# Patient Record
Sex: FEMALE | Race: WHITE
[De-identification: names, ages, dates, MRNs, and addresses within clinical notes are randomized per-mention and may not be internally consistent; named-entity substitution may affect disease eponyms.]

---

## 2023-01-17 ENCOUNTER — HOSPITAL ENCOUNTER (OUTPATIENT)
Dept: HOSPITAL 6 - RAD | Age: 73
End: 2023-01-17
Attending: FAMILY MEDICINE
Payer: MEDICARE

## 2023-01-17 DIAGNOSIS — J98.11: ICD-10-CM

## 2023-01-17 DIAGNOSIS — R91.8: Primary | ICD-10-CM

## 2023-02-10 ENCOUNTER — HOSPITAL ENCOUNTER (OUTPATIENT)
Dept: HOSPITAL 6 - RAD | Age: 73
End: 2023-02-10
Attending: FAMILY MEDICINE
Payer: MEDICARE

## 2023-02-10 DIAGNOSIS — Z90.5: ICD-10-CM

## 2023-02-10 DIAGNOSIS — N28.1: Primary | ICD-10-CM

## 2023-02-10 DIAGNOSIS — N13.30: ICD-10-CM

## 2023-10-30 ENCOUNTER — HOSPITAL ENCOUNTER (OUTPATIENT)
Dept: HOSPITAL 19 - SDCO | Age: 73
Discharge: HOME | End: 2023-10-30
Attending: SURGERY
Payer: MEDICARE

## 2023-10-30 VITALS — DIASTOLIC BLOOD PRESSURE: 65 MMHG | HEART RATE: 84 BPM | TEMPERATURE: 98 F | SYSTOLIC BLOOD PRESSURE: 170 MMHG

## 2023-10-30 VITALS — WEIGHT: 204.37 LBS | BODY MASS INDEX: 36.21 KG/M2 | HEIGHT: 63 IN

## 2023-10-30 VITALS — DIASTOLIC BLOOD PRESSURE: 70 MMHG | TEMPERATURE: 97.4 F | HEART RATE: 75 BPM | SYSTOLIC BLOOD PRESSURE: 169 MMHG

## 2023-10-30 VITALS — SYSTOLIC BLOOD PRESSURE: 158 MMHG | HEART RATE: 60 BPM | TEMPERATURE: 97.1 F | DIASTOLIC BLOOD PRESSURE: 36 MMHG

## 2023-10-30 VITALS — HEART RATE: 78 BPM | SYSTOLIC BLOOD PRESSURE: 146 MMHG | DIASTOLIC BLOOD PRESSURE: 35 MMHG

## 2023-10-30 VITALS — SYSTOLIC BLOOD PRESSURE: 159 MMHG | DIASTOLIC BLOOD PRESSURE: 71 MMHG | HEART RATE: 74 BPM

## 2023-10-30 DIAGNOSIS — E66.9: ICD-10-CM

## 2023-10-30 DIAGNOSIS — Z87.891: ICD-10-CM

## 2023-10-30 DIAGNOSIS — K80.10: Primary | ICD-10-CM

## 2023-10-30 LAB
ANION GAP SERPL CALC-SCNC: 10 MMOL/L (ref 7–16)
BUN SERPL-MCNC: 56 MG/DL (ref 10–20)
CALCIUM SERPL-MCNC: 9.6 MG/DL (ref 8.4–10.2)
CHLORIDE SERPL-SCNC: 104 MMOL/L (ref 98–107)
CO2 SERPL-SCNC: 25 MMOL/L (ref 23–31)
CREAT SERPL-SCNC: 1.28 MG/DL (ref 0.57–1.11)
GLUCOSE SERPL-MCNC: 156 MG/DL (ref 70–99)
POTASSIUM SERPL-SCNC: 4.6 MMOL/L (ref 3.5–4.5)
SODIUM SERPL-SCNC: 139 MMOL/L (ref 136–145)

## 2023-10-30 NOTE — NUR
1043: PATIENT AMBULATED TO BAY 7 WITH STEADY GAIT. ALERT AND ORIENTED X4.
PATIENT STATED UNDERSTANDING OF PROCEDURE. CONSENTS SIGNED. ASSESSMENT
COMPLETED. 20G IV STARTED IN RIGHT FOREARM. LR INFUSING WITHOUT DIFFICULTIES.
WARM BLANKET PROVIDED. NO FURTHER NEEDS NOTED AT THIS TIME. RESTING IN COT.
CALL LIGHT IN REACH. YOBANI GALLOWAY, AT BEDSIDE.
 
1145: PATIENT STATED CONTRAST DYE HAS CAUSED HIVES IN THE PAST. DR. CORONADO
NOTIFIED AND STATED OKAY TO GIVE ICG PRE-OP AS ORDERED.
 
1215: PATIENT HAS NO S/S OF HIVES OR FURTHER COMPLAINTS AT THIS TIME. OR NURSE
APPLIED 2X2 TO PATIENT RIGHT EYE DUE TO PREVIOUS EYE SURGERY. RESTING IN COT.
CALL LIGHT IN REACH. YOBANI GALLOWAY, AT BEDSIDE.

## 2024-03-19 ENCOUNTER — HOSPITAL ENCOUNTER (EMERGENCY)
Dept: HOSPITAL 6 - ED | Age: 74
Discharge: ANOTHER HOSPITAL | End: 2024-03-19
Payer: MEDICARE

## 2024-03-19 VITALS — BODY MASS INDEX: 37.11 KG/M2 | HEIGHT: 62.99 IN | WEIGHT: 209.44 LBS

## 2024-03-19 VITALS — DIASTOLIC BLOOD PRESSURE: 100 MMHG | SYSTOLIC BLOOD PRESSURE: 176 MMHG

## 2024-03-19 DIAGNOSIS — Z90.49: ICD-10-CM

## 2024-03-19 DIAGNOSIS — R79.89: ICD-10-CM

## 2024-03-19 DIAGNOSIS — R10.10: ICD-10-CM

## 2024-03-19 DIAGNOSIS — R07.89: Primary | ICD-10-CM

## 2024-03-19 DIAGNOSIS — R20.0: ICD-10-CM

## 2024-03-19 LAB
ALBUMIN SERPL-MCNC: 3.8 G/DL (ref 3.4–4.8)
ALT SERPL-CCNC: 9 U/L (ref 0–55)
APPEARANCE UR: CLEAR
AST SERPL-CCNC: 16 U/L (ref 5–34)
BASOPHILS # BLD: 0.02 K/MM3 (ref 0.02–0.1)
BILIRUB SERPL-MCNC: 0.3 MG/DL (ref 0.2–1.2)
BILIRUB UR QL STRIP.AUTO: NEGATIVE
CALCIUM SERPL-MCNC: 12.1 MG/DL (ref 8.3–10.5)
CO2 SERPL-SCNC: 32 MMOL/L (ref 23–31)
COLOR UR AUTO: (no result)
EOSINOPHIL # BLD: 0.09 K/MM3 (ref 0.04–0.4)
EOSINOPHIL NFR BLD: 1.2 % (ref 1–5)
ERYTHROCYTE [DISTWIDTH] IN BLOOD BY AUTOMATED COUNT: 11.2 % (ref 11.5–14.5)
GLUCOSE SERPL-MCNC: 198 MG/DL (ref 65–105)
GLUCOSE UR QL STRIP.AUTO: (no result)
HCT VFR BLD AUTO: 41.7 % (ref 37–47)
HGB BLD-MCNC: 13.4 G/DL (ref 12.5–16)
KETONES UR QL STRIP.AUTO: NEGATIVE
LEUKOCYTE ESTERASE UR QL STRIP: NEGATIVE
LYMPHOCYTES # BLD: 1.94 K/MM3 (ref 1.5–4)
MCH RBC QN AUTO: 32 PG (ref 27–31)
MCHC RBC AUTO-ENTMCNC: 32 G/DL (ref 33–37)
MCV RBC AUTO: 98 FL (ref 78–100)
MONOCYTES # BLD: 0.66 K/MM3 (ref 0.2–0.8)
NEUTROPHILS # BLD: 4.89 K/MM3 (ref 1.4–6.5)
NITRITE UR QL STRIP: NEGATIVE
PH UR STRIP.AUTO: 7.5 [PH] (ref 5–8)
PLATELET # BLD AUTO: 223 K/MM3 (ref 130–400)
PMV BLD AUTO: 11 FL (ref 7.4–10.4)
PROT ?TM UR-MCNC: (no result) MG/DL
PROT SERPL-MCNC: 7.4 G/DL (ref 6.2–8.1)
RBC # BLD AUTO: 4.25 M/MM3 (ref 4.1–5.3)
RBC UR QL AUTO: (no result)
SODIUM SERPL-SCNC: 140 MMOL/L (ref 136–145)
SP GR UR STRIP.AUTO: 1.02 (ref 1–1.03)
TROPONIN I SERPL-MCNC: 0.05 NG/ML (ref 0–0.03)
WBC # BLD AUTO: 7.6 K/MM3 (ref 4.8–10.8)
WBC #/AREA URNS HPF: (no result) /HPF (ref 0–3)

## 2024-04-23 ENCOUNTER — HOSPITAL ENCOUNTER (OUTPATIENT)
Dept: HOSPITAL 6 - CARDREHAB | Age: 74
LOS: 7 days | Discharge: HOME | End: 2024-04-30
Attending: NURSE PRACTITIONER
Payer: MEDICARE

## 2024-04-23 DIAGNOSIS — Z48.812: Primary | ICD-10-CM

## 2024-04-23 DIAGNOSIS — I21.4: ICD-10-CM

## 2024-04-23 DIAGNOSIS — Z98.61: ICD-10-CM

## 2024-05-22 ENCOUNTER — HOSPITAL ENCOUNTER (INPATIENT)
Dept: HOSPITAL 19 - COL.ER | Age: 74
LOS: 2 days | Discharge: HOME | DRG: 811 | End: 2024-05-24
Attending: INTERNAL MEDICINE | Admitting: INTERNAL MEDICINE
Payer: MEDICARE

## 2024-05-22 DIAGNOSIS — D64.9: Primary | ICD-10-CM

## 2024-05-22 DIAGNOSIS — Z90.5: ICD-10-CM

## 2024-05-22 DIAGNOSIS — I08.3: ICD-10-CM

## 2024-05-22 DIAGNOSIS — Z87.19: ICD-10-CM

## 2024-05-22 DIAGNOSIS — Z99.81: ICD-10-CM

## 2024-05-22 DIAGNOSIS — Z79.02: ICD-10-CM

## 2024-05-22 DIAGNOSIS — N18.9: ICD-10-CM

## 2024-05-22 DIAGNOSIS — Z90.49: ICD-10-CM

## 2024-05-22 DIAGNOSIS — Z87.891: ICD-10-CM

## 2024-05-22 DIAGNOSIS — Z79.899: ICD-10-CM

## 2024-05-22 DIAGNOSIS — J44.9: ICD-10-CM

## 2024-05-22 DIAGNOSIS — I50.32: ICD-10-CM

## 2024-05-22 DIAGNOSIS — I25.10: ICD-10-CM

## 2024-05-22 DIAGNOSIS — I25.2: ICD-10-CM

## 2024-05-22 DIAGNOSIS — I13.0: ICD-10-CM

## 2024-05-22 DIAGNOSIS — Z88.8: ICD-10-CM

## 2024-05-22 DIAGNOSIS — Z79.82: ICD-10-CM

## 2024-05-22 DIAGNOSIS — Z95.5: ICD-10-CM

## 2024-05-22 DIAGNOSIS — E78.5: ICD-10-CM

## 2024-05-22 DIAGNOSIS — E11.22: ICD-10-CM

## 2024-05-22 DIAGNOSIS — J96.21: ICD-10-CM

## 2024-08-30 ENCOUNTER — HOSPITAL ENCOUNTER (INPATIENT)
Dept: HOSPITAL 19 - COL.ER | Age: 74
LOS: 3 days | Discharge: HOME | DRG: 193 | End: 2024-09-02
Attending: INTERNAL MEDICINE | Admitting: INTERNAL MEDICINE
Payer: MEDICARE

## 2024-08-30 VITALS — TEMPERATURE: 98.5 F | DIASTOLIC BLOOD PRESSURE: 78 MMHG | HEART RATE: 77 BPM | SYSTOLIC BLOOD PRESSURE: 153 MMHG

## 2024-08-30 VITALS — TEMPERATURE: 98.2 F | DIASTOLIC BLOOD PRESSURE: 68 MMHG | HEART RATE: 83 BPM | SYSTOLIC BLOOD PRESSURE: 151 MMHG

## 2024-08-30 VITALS — HEIGHT: 62.99 IN | BODY MASS INDEX: 38.12 KG/M2 | WEIGHT: 215.17 LBS

## 2024-08-30 VITALS — TEMPERATURE: 98.3 F | SYSTOLIC BLOOD PRESSURE: 148 MMHG | HEART RATE: 75 BPM | DIASTOLIC BLOOD PRESSURE: 72 MMHG

## 2024-08-30 VITALS — TEMPERATURE: 97.9 F | DIASTOLIC BLOOD PRESSURE: 69 MMHG | HEART RATE: 74 BPM | SYSTOLIC BLOOD PRESSURE: 151 MMHG

## 2024-08-30 VITALS — SYSTOLIC BLOOD PRESSURE: 149 MMHG | TEMPERATURE: 98 F | HEART RATE: 87 BPM | DIASTOLIC BLOOD PRESSURE: 61 MMHG

## 2024-08-30 VITALS — SYSTOLIC BLOOD PRESSURE: 135 MMHG

## 2024-08-30 DIAGNOSIS — J96.21: ICD-10-CM

## 2024-08-30 DIAGNOSIS — Z90.5: ICD-10-CM

## 2024-08-30 DIAGNOSIS — E11.22: ICD-10-CM

## 2024-08-30 DIAGNOSIS — Z88.6: ICD-10-CM

## 2024-08-30 DIAGNOSIS — J44.9: ICD-10-CM

## 2024-08-30 DIAGNOSIS — K59.03: ICD-10-CM

## 2024-08-30 DIAGNOSIS — Z79.899: ICD-10-CM

## 2024-08-30 DIAGNOSIS — N18.30: ICD-10-CM

## 2024-08-30 DIAGNOSIS — Z91.018: ICD-10-CM

## 2024-08-30 DIAGNOSIS — R53.81: ICD-10-CM

## 2024-08-30 DIAGNOSIS — N17.9: ICD-10-CM

## 2024-08-30 DIAGNOSIS — Z23: ICD-10-CM

## 2024-08-30 DIAGNOSIS — I27.20: ICD-10-CM

## 2024-08-30 DIAGNOSIS — Z88.8: ICD-10-CM

## 2024-08-30 DIAGNOSIS — I50.32: ICD-10-CM

## 2024-08-30 DIAGNOSIS — Z79.82: ICD-10-CM

## 2024-08-30 DIAGNOSIS — Z87.891: ICD-10-CM

## 2024-08-30 DIAGNOSIS — D50.9: ICD-10-CM

## 2024-08-30 DIAGNOSIS — E78.5: ICD-10-CM

## 2024-08-30 DIAGNOSIS — Z20.822: ICD-10-CM

## 2024-08-30 DIAGNOSIS — I13.0: ICD-10-CM

## 2024-08-30 DIAGNOSIS — T45.4X5A: ICD-10-CM

## 2024-08-30 DIAGNOSIS — Z90.49: ICD-10-CM

## 2024-08-30 DIAGNOSIS — Z99.81: ICD-10-CM

## 2024-08-30 DIAGNOSIS — J18.1: Primary | ICD-10-CM

## 2024-08-30 DIAGNOSIS — Z91.041: ICD-10-CM

## 2024-08-30 LAB
ALBUMIN SERPL-MCNC: 2.6 G/DL (ref 3.4–4.8)
ALP SERPL-CCNC: 103 U/L (ref 40–150)
ALT SERPL-CCNC: < 6 U/L (ref 0–55)
ANION GAP SERPL CALC-SCNC: 14 MMOL/L (ref 7–16)
AST SERPL-CCNC: 11 U/L (ref 5–34)
BASOPHILS # BLD: 0 K/MM3 (ref 0–0.2)
BASOPHILS NFR BLD AUTO: 0.3 % (ref 0–2)
BILIRUB SERPL-MCNC: 0.3 MG/DL (ref 0.2–1.2)
BUN SERPL-MCNC: 87 MG/DL (ref 10–20)
CALCIUM SERPL-MCNC: 9.5 MG/DL (ref 8.4–10.2)
CHLORIDE SERPL-SCNC: 93 MEQ/L (ref 98–107)
CREAT SERPL-SCNC: 2.26 MG/DL (ref 0.57–1.11)
EOSINOPHIL # BLD: 0.2 K/MM3 (ref 0–0.7)
EOSINOPHIL NFR BLD: 1.5 % (ref 0–4)
ERYTHROCYTE [DISTWIDTH] IN BLOOD BY AUTOMATED COUNT: 12.7 % (ref 11.5–14.5)
GLUCOSE SERPL-MCNC: 282 MG/DL (ref 70–99)
GRANULOCYTES # BLD AUTO: 80.5 % (ref 42.2–75.2)
HCT VFR BLD AUTO: 21.4 % (ref 37–47)
HGB BLD-MCNC: 6.5 G/DL (ref 12.5–16)
INR BLD: 1.2 (ref 0.8–3)
IRON SERPL-MCNC: 24 UG/DL (ref 50–175)
LYMPHOCYTES # BLD: 0.8 K/MM3 (ref 1.2–3.4)
LYMPHOCYTES NFR BLD: 7.2 % (ref 20–51)
MCH RBC QN AUTO: 30 PG (ref 27–31)
MCHC RBC AUTO-ENTMCNC: 30 G/DL (ref 33–37)
MCV RBC AUTO: 98 FL (ref 80–100)
MONOCYTES # BLD: 1.1 K/MM3 (ref 0.1–0.6)
MONOCYTES NFR BLD AUTO: 10 % (ref 1.7–9.3)
NEUTROPHILS # BLD: 8.9 K/MM3 (ref 1.4–6.5)
PLATELET # BLD AUTO: 416 K/MM3 (ref 130–400)
PMV BLD AUTO: 10.6 FL (ref 7.4–10.4)
POTASSIUM SERPL-SCNC: 3.6 MEQ/L (ref 3.5–4.5)
PROT SERPL-MCNC: 6.9 G/DL (ref 6.2–8.1)
PROTHROMBIN TIME: 13.2 SECONDS (ref 9.7–12.8)
RBC # BLD AUTO: 2.19 M/MM3 (ref 4.1–5.3)
RETICS #: 0.07 M/MM3 (ref 0.02–0.16)
RETICS/RBC NFR AUTO: 3.3 % (ref 0.5–3.52)
SODIUM SERPL-SCNC: 139 MEQ/L (ref 136–145)
TROPONIN I SERPL-MCNC: 0.01 NG/ML (ref 0–0.03)

## 2024-08-30 PROCEDURE — A9284 NON-ELECTRONIC SPIROMETER: HCPCS

## 2024-08-30 PROCEDURE — 30233N1 TRANSFUSION OF NONAUTOLOGOUS RED BLOOD CELLS INTO PERIPHERAL VEIN, PERCUTANEOUS APPROACH: ICD-10-PCS | Performed by: INTERNAL MEDICINE

## 2024-08-30 PROCEDURE — P9016 RBC LEUKOCYTES REDUCED: HCPCS

## 2024-08-30 NOTE — NUR
Pt arrived to medical floor from ED. Report received from ANAM Aquino by phone.
Admission intake and assessment completed. +2 BLE edema noted with blisters to
both BLE around ankles. Small scab with redness noted on inner Lt lower calf.
O2 in place at 4L NC. IVF infusing into Rt AC with no complications. Oriented
pt to room, call light, and bathroom. Pt wearing personal glasses and partial
dentures during hospital stay. Son Munir at bedside. Pt denies pain at this
time rating 0/10. Home medications, allergies, and pharmacy reviewed with pt.
Pt has no request at this time. Call light within reach.

## 2024-08-31 VITALS — TEMPERATURE: 98.1 F | DIASTOLIC BLOOD PRESSURE: 70 MMHG | SYSTOLIC BLOOD PRESSURE: 142 MMHG | HEART RATE: 88 BPM

## 2024-08-31 VITALS — TEMPERATURE: 98.3 F | HEART RATE: 86 BPM | DIASTOLIC BLOOD PRESSURE: 72 MMHG | SYSTOLIC BLOOD PRESSURE: 145 MMHG

## 2024-08-31 VITALS — DIASTOLIC BLOOD PRESSURE: 51 MMHG | HEART RATE: 81 BPM | TEMPERATURE: 97.9 F | SYSTOLIC BLOOD PRESSURE: 105 MMHG

## 2024-08-31 VITALS — HEART RATE: 87 BPM | SYSTOLIC BLOOD PRESSURE: 157 MMHG | DIASTOLIC BLOOD PRESSURE: 63 MMHG | TEMPERATURE: 97.7 F

## 2024-08-31 VITALS — DIASTOLIC BLOOD PRESSURE: 78 MMHG | HEART RATE: 72 BPM | TEMPERATURE: 98 F | SYSTOLIC BLOOD PRESSURE: 152 MMHG

## 2024-08-31 VITALS — DIASTOLIC BLOOD PRESSURE: 73 MMHG | HEART RATE: 67 BPM | SYSTOLIC BLOOD PRESSURE: 145 MMHG | TEMPERATURE: 97.7 F

## 2024-08-31 VITALS — DIASTOLIC BLOOD PRESSURE: 66 MMHG | SYSTOLIC BLOOD PRESSURE: 148 MMHG | TEMPERATURE: 97.8 F | HEART RATE: 83 BPM

## 2024-08-31 VITALS — DIASTOLIC BLOOD PRESSURE: 69 MMHG | SYSTOLIC BLOOD PRESSURE: 135 MMHG | HEART RATE: 81 BPM | TEMPERATURE: 98.5 F

## 2024-08-31 VITALS — SYSTOLIC BLOOD PRESSURE: 135 MMHG

## 2024-08-31 VITALS — SYSTOLIC BLOOD PRESSURE: 105 MMHG

## 2024-08-31 VITALS — SYSTOLIC BLOOD PRESSURE: 146 MMHG | TEMPERATURE: 98.4 F | DIASTOLIC BLOOD PRESSURE: 77 MMHG | HEART RATE: 78 BPM

## 2024-08-31 VITALS — SYSTOLIC BLOOD PRESSURE: 152 MMHG

## 2024-08-31 VITALS — SYSTOLIC BLOOD PRESSURE: 145 MMHG

## 2024-08-31 VITALS — SYSTOLIC BLOOD PRESSURE: 157 MMHG

## 2024-08-31 VITALS — SYSTOLIC BLOOD PRESSURE: 142 MMHG

## 2024-08-31 LAB
ANION GAP SERPL CALC-SCNC: 12 MMOL/L (ref 7–16)
BUN SERPL-MCNC: 72 MG/DL (ref 10–20)
CALCIUM SERPL-MCNC: 9.7 MG/DL (ref 8.4–10.2)
CHLORIDE SERPL-SCNC: 97 MEQ/L (ref 98–107)
CREAT SERPL-SCNC: 1.84 MG/DL (ref 0.57–1.11)
CRP SERPL-MCNC: 6.59 MG/DL (ref 0–0.5)
ERYTHROCYTE [DISTWIDTH] IN BLOOD BY AUTOMATED COUNT: 14.1 % (ref 11.5–14.5)
GLUCOSE SERPL-MCNC: 327 MG/DL (ref 70–99)
HCT VFR BLD AUTO: 22.9 % (ref 37–47)
HCT VFR BLD AUTO: 24.7 % (ref 37–47)
HGB BLD-MCNC: 7.6 G/DL (ref 12.5–16)
HGB BLD-MCNC: 7.9 G/DL (ref 12.5–16)
HYPOCHROMIA BLD QL SMEAR: (no result)
LYMPHOCYTES NFR BLD MANUAL: 2 % (ref 20–51)
MAGNESIUM SERPL-MCNC: 1.9 MG/DL (ref 1.6–2.6)
MCH RBC QN AUTO: 30 PG (ref 27–31)
MCHC RBC AUTO-ENTMCNC: 32 G/DL (ref 33–37)
MCV RBC AUTO: 94 FL (ref 80–100)
NEUTS SEG NFR BLD MANUAL: 98 % (ref 42–75.2)
PHOSPHATE SERPL-MCNC: 2.7 MG/DL (ref 2.3–4.7)
PLATELET # BLD AUTO: 437 K/MM3 (ref 130–400)
PLATELET BLD QL SMEAR: (no result)
PMV BLD AUTO: 10.4 FL (ref 7.4–10.4)
POTASSIUM SERPL-SCNC: 4.2 MEQ/L (ref 3.5–4.5)
RBC # BLD AUTO: 2.63 M/MM3 (ref 4.1–5.3)
SODIUM SERPL-SCNC: 140 MEQ/L (ref 136–145)

## 2024-08-31 NOTE — NUR
THE PATIENT RESTED WELL MOST OF THE NIGHT DESPITE ALL OF THE INTERRUPTIONS
THROUGHOUT THE NIGHT. THE PATIENTS VITAL SIGNS REMAINED STABLE AND SHE
RECEIVED HER ONE UNIT OF PRBC'S WITHOUT DIFFICULTY. THE PATIENT REMAINED ON 2
LPM WHICH IS HER BASELINE OXYGEN AMOUNT. THE PATIENT DENIED PAIN OR
DISCOMFORT.

## 2024-08-31 NOTE — NUR
SEDRICK CHAVEZ NOTIFIED OF H&H RESULT OF 7.6/22.9. A RECHECK WILL BE
DONE IN THE LATER AM. AT THIS TIME NO NEW ORDERS.

## 2024-08-31 NOTE — NUR
CALLED AND MADE AWARE OF PATIENT REQUEST FOR TUMS, AS WELL AS
OINTMENT FOR BUTTOCKS WOUND. WILL MONITOR SUGAR AGAIN IN AN HOUR, WANTING TO
GIVEN INSULIN TIME TO WORK

## 2024-08-31 NOTE — NUR
medication list reviewed with patient, corrections made. Patient concerned
about missing medication. will review with doctor.

## 2024-08-31 NOTE — NUR
SW met with patient and family to complete initial assessment for discharge
planning.  Patient known to this SW from previous hospitalization.  Patient
continues to live in [facility] alone, has her nephew Enrique living across
street to provide assistance if needed.  Patient's son Munir ([phone])
also assists as needed.  Patient sees Dr. Nicholas Fisher as her PCP and
uses [facility].  Patient has oxygen at home provided by [employer].  Patient denied having a DPOA completed but asked to complete one.  She
named son Munir as DPOA with nephew Enrique ([phone]) as alternate.  Plan is
to return home at discharge.
 
Discharge plan:  Home

## 2024-08-31 NOTE — NUR
Patient resting in bed. She had been up to the bathroom this am and voided
without problems. Breakfast ordered, denies nausea. Patient elevated blood
sugar treated, we discussed side effects of predisone, elevating blood
glucose. Scds ble. Patient reports only pain is in her coccyx wound/cyst. Will
monitor

## 2024-08-31 NOTE — NUR
Follow-up visit: Patient's visitors have left. Patient is watching some TV and
relaxing; states she has no spiritual needs at this time. Accpeted prayer.
Patient thanked  for visit; stated she enjoys having someone to talk
to and  would be welcome to visit again.

## 2024-08-31 NOTE — NUR
THE PATIENT RECEIVED HER BLOOD TRANSFUSION OF ONE UNIT WHICH STARTED AT 22:35
AND ENDED AT 01:04. THE PATIENT TOLERATED THE TRANSFUSION WITHOUT AND S/S OF A
TRANSFUSION REACTION. VITAL SIGNS STABLE. WILL MONITOR.

## 2024-08-31 NOTE — NUR
MADE AWARE 435, NOW DOSE OF HUMALOG ORDERED. TELE HEALTH CONSULT
COMPLETED.  ROUNDED, PLAN OF CARE REVIEWED

## 2024-08-31 NOTE — NUR
Patient up to bathroom, unable to have BM. Requesting medication to help her
go.  called and miralax given as ordered.

## 2024-08-31 NOTE — NUR
Patient resting in bed. Blood sugars remain elevated.  called and
another dose of insulin given per orders. Patient frustated with her blood
sugar levels. No interest in dinner. Will report off to nightnurse

## 2024-08-31 NOTE — NUR
Data: Spiritual care visit attempted. Patient was indisposed.  saw
that Patient had at least three visitors.
 
Assessment: None at this time.
 
Plan of Care: Chaplains will remain available as needed/requested while
Patient is admitted to this hospital.

## 2024-09-01 VITALS — SYSTOLIC BLOOD PRESSURE: 131 MMHG

## 2024-09-01 VITALS — TEMPERATURE: 98 F | DIASTOLIC BLOOD PRESSURE: 68 MMHG | SYSTOLIC BLOOD PRESSURE: 103 MMHG | HEART RATE: 80 BPM

## 2024-09-01 VITALS — HEART RATE: 74 BPM | DIASTOLIC BLOOD PRESSURE: 88 MMHG | TEMPERATURE: 97.5 F | SYSTOLIC BLOOD PRESSURE: 148 MMHG

## 2024-09-01 VITALS — DIASTOLIC BLOOD PRESSURE: 68 MMHG | TEMPERATURE: 98.2 F | SYSTOLIC BLOOD PRESSURE: 143 MMHG | HEART RATE: 79 BPM

## 2024-09-01 VITALS — HEART RATE: 90 BPM | DIASTOLIC BLOOD PRESSURE: 64 MMHG | TEMPERATURE: 98.7 F | SYSTOLIC BLOOD PRESSURE: 134 MMHG

## 2024-09-01 VITALS — TEMPERATURE: 97.7 F | SYSTOLIC BLOOD PRESSURE: 155 MMHG | HEART RATE: 77 BPM | DIASTOLIC BLOOD PRESSURE: 65 MMHG

## 2024-09-01 VITALS — HEART RATE: 72 BPM | DIASTOLIC BLOOD PRESSURE: 82 MMHG | SYSTOLIC BLOOD PRESSURE: 131 MMHG | TEMPERATURE: 98 F

## 2024-09-01 VITALS — SYSTOLIC BLOOD PRESSURE: 155 MMHG

## 2024-09-01 VITALS — SYSTOLIC BLOOD PRESSURE: 105 MMHG

## 2024-09-01 VITALS — SYSTOLIC BLOOD PRESSURE: 143 MMHG

## 2024-09-01 VITALS — SYSTOLIC BLOOD PRESSURE: 103 MMHG

## 2024-09-01 LAB
ANION GAP SERPL CALC-SCNC: 12 MMOL/L (ref 7–16)
BASOPHILS # BLD: 0 K/MM3 (ref 0–0.2)
BASOPHILS NFR BLD AUTO: 0.1 % (ref 0–2)
BUN SERPL-MCNC: 68 MG/DL (ref 10–20)
CALCIUM SERPL-MCNC: 9.5 MG/DL (ref 8.4–10.2)
CHLORIDE SERPL-SCNC: 95 MEQ/L (ref 98–107)
CREAT SERPL-SCNC: 1.88 MG/DL (ref 0.57–1.11)
EOSINOPHIL # BLD: 0 K/MM3 (ref 0–0.7)
EOSINOPHIL NFR BLD: 0.2 % (ref 0–4)
ERYTHROCYTE [DISTWIDTH] IN BLOOD BY AUTOMATED COUNT: 13.8 % (ref 11.5–14.5)
GLUCOSE SERPL-MCNC: 238 MG/DL (ref 70–99)
GRANULOCYTES # BLD AUTO: 83.6 % (ref 42.2–75.2)
HCT VFR BLD AUTO: 22.2 % (ref 37–47)
HGB BLD-MCNC: 7.2 G/DL (ref 12.5–16)
LYMPHOCYTES # BLD: 1.3 K/MM3 (ref 1.2–3.4)
LYMPHOCYTES NFR BLD: 9.1 % (ref 20–51)
MAGNESIUM SERPL-MCNC: 1.9 MG/DL (ref 1.6–2.6)
MCH RBC QN AUTO: 30 PG (ref 27–31)
MCHC RBC AUTO-ENTMCNC: 32 G/DL (ref 33–37)
MCV RBC AUTO: 92 FL (ref 80–100)
MONOCYTES # BLD: 0.9 K/MM3 (ref 0.1–0.6)
MONOCYTES NFR BLD AUTO: 6.5 % (ref 1.7–9.3)
NEUTROPHILS # BLD: 12 K/MM3 (ref 1.4–6.5)
PLATELET # BLD AUTO: 390 K/MM3 (ref 130–400)
PMV BLD AUTO: 10.2 FL (ref 7.4–10.4)
POTASSIUM SERPL-SCNC: 3.6 MEQ/L (ref 3.5–4.5)
RBC # BLD AUTO: 2.41 M/MM3 (ref 4.1–5.3)
SODIUM SERPL-SCNC: 139 MEQ/L (ref 136–145)

## 2024-09-01 NOTE — NUR
Patient is sitting up at the edge of the bed, alert and orientd x 4, denies
any pain, discomfort or weaknes. Assessment completed, meds given. Getting 2L
O2 NC. No further needs at this time. Call light within reach.

## 2024-09-01 NOTE — NUR
THE PATIENT DID NOT SLEEP MOST OF THE NIGHT. AROUND 4 AM THE PATIENT WAS NOTED
TO BE RESTING IN BED
WITH HER EYES CLOSED. THE PATIENT WAS UP INDEPENDENTLY IN THE
ROOM WITH A STEADY GAIT. THE PATIENT REQUESTED TYLENOL THIS MORNING FOR
HEADACHE PAIN 4/10. NO OTHER NEEDS AT THIS TIME.

## 2024-09-02 VITALS — SYSTOLIC BLOOD PRESSURE: 138 MMHG

## 2024-09-02 VITALS — DIASTOLIC BLOOD PRESSURE: 69 MMHG | TEMPERATURE: 98.3 F | HEART RATE: 67 BPM | SYSTOLIC BLOOD PRESSURE: 123 MMHG

## 2024-09-02 VITALS — SYSTOLIC BLOOD PRESSURE: 123 MMHG

## 2024-09-02 VITALS — HEART RATE: 76 BPM | TEMPERATURE: 97.9 F | DIASTOLIC BLOOD PRESSURE: 67 MMHG | SYSTOLIC BLOOD PRESSURE: 128 MMHG

## 2024-09-02 VITALS — HEART RATE: 74 BPM | SYSTOLIC BLOOD PRESSURE: 132 MMHG | DIASTOLIC BLOOD PRESSURE: 64 MMHG

## 2024-09-02 VITALS — DIASTOLIC BLOOD PRESSURE: 71 MMHG | SYSTOLIC BLOOD PRESSURE: 118 MMHG | TEMPERATURE: 98.2 F | HEART RATE: 75 BPM

## 2024-09-02 VITALS — SYSTOLIC BLOOD PRESSURE: 146 MMHG | HEART RATE: 87 BPM | DIASTOLIC BLOOD PRESSURE: 69 MMHG | TEMPERATURE: 98.4 F

## 2024-09-02 VITALS — SYSTOLIC BLOOD PRESSURE: 146 MMHG

## 2024-09-02 VITALS — HEART RATE: 79 BPM | SYSTOLIC BLOOD PRESSURE: 119 MMHG | DIASTOLIC BLOOD PRESSURE: 71 MMHG | TEMPERATURE: 98.2 F

## 2024-09-02 VITALS — HEART RATE: 78 BPM | DIASTOLIC BLOOD PRESSURE: 66 MMHG | TEMPERATURE: 98.2 F | SYSTOLIC BLOOD PRESSURE: 117 MMHG

## 2024-09-02 VITALS — TEMPERATURE: 98.1 F | HEART RATE: 71 BPM | DIASTOLIC BLOOD PRESSURE: 69 MMHG | SYSTOLIC BLOOD PRESSURE: 138 MMHG

## 2024-09-02 VITALS — SYSTOLIC BLOOD PRESSURE: 128 MMHG

## 2024-09-02 VITALS — SYSTOLIC BLOOD PRESSURE: 134 MMHG

## 2024-09-02 LAB
ANION GAP SERPL CALC-SCNC: 11 MMOL/L (ref 7–16)
BUN SERPL-MCNC: 56 MG/DL (ref 10–20)
CALCIUM SERPL-MCNC: 9.2 MG/DL (ref 8.4–10.2)
CHLORIDE SERPL-SCNC: 97 MEQ/L (ref 98–107)
CREAT SERPL-SCNC: 1.7 MG/DL (ref 0.57–1.11)
GLUCOSE SERPL-MCNC: 126 MG/DL (ref 70–99)
HCT VFR BLD AUTO: 20.4 % (ref 37–47)
HCT VFR BLD AUTO: 25.1 % (ref 37–47)
HGB BLD-MCNC: 6.5 G/DL (ref 12.5–16)
HGB BLD-MCNC: 8.2 G/DL (ref 12.5–16)
POTASSIUM SERPL-SCNC: 4.3 MEQ/L (ref 3.5–4.5)
SODIUM SERPL-SCNC: 140 MEQ/L (ref 136–145)

## 2024-09-02 NOTE — NUR
PT HAS DISCHARGE ORDERS. WENT OVER DISCHARGE PAPERWORK WITH PT. [facility] OUT [facility].

## 2024-09-02 NOTE — NUR
PT RESTING IN BED, AELRT AND ORIENTEDX4. NO COMPLAINTS OF PAIN AT THIS TIME.
ASSESSED PT. SOME LOWER EXTREMEITY EDEMA. PT STATES SHE PUTS CREAM ON BOTTOM
HERSELF. PT PLANS TO GO HOME TODAY IF HEMOGLOBIN REMAINS STABLE. NO OTHER
COMPLAINTS AT THIS TIME. CALL LIGHT WITHIN REACH.

## 2024-09-07 ENCOUNTER — HOSPITAL ENCOUNTER (INPATIENT)
Dept: HOSPITAL 19 - COL.ER | Age: 74
LOS: 2 days | Discharge: HOME | DRG: 196 | End: 2024-09-09
Attending: INTERNAL MEDICINE | Admitting: INTERNAL MEDICINE
Payer: MEDICARE

## 2024-09-07 VITALS — SYSTOLIC BLOOD PRESSURE: 148 MMHG

## 2024-09-07 VITALS — TEMPERATURE: 98.3 F | HEART RATE: 76 BPM | DIASTOLIC BLOOD PRESSURE: 66 MMHG | SYSTOLIC BLOOD PRESSURE: 127 MMHG

## 2024-09-07 VITALS — SYSTOLIC BLOOD PRESSURE: 148 MMHG | DIASTOLIC BLOOD PRESSURE: 68 MMHG | HEART RATE: 70 BPM | TEMPERATURE: 97.8 F

## 2024-09-07 VITALS — TEMPERATURE: 97.9 F | HEART RATE: 80 BPM | DIASTOLIC BLOOD PRESSURE: 62 MMHG | SYSTOLIC BLOOD PRESSURE: 142 MMHG

## 2024-09-07 VITALS — TEMPERATURE: 98 F | SYSTOLIC BLOOD PRESSURE: 133 MMHG | HEART RATE: 66 BPM | DIASTOLIC BLOOD PRESSURE: 78 MMHG

## 2024-09-07 VITALS — BODY MASS INDEX: 31.25 KG/M2 | HEIGHT: 62.99 IN | WEIGHT: 176.37 LBS

## 2024-09-07 VITALS — SYSTOLIC BLOOD PRESSURE: 127 MMHG

## 2024-09-07 DIAGNOSIS — I25.10: ICD-10-CM

## 2024-09-07 DIAGNOSIS — Z88.6: ICD-10-CM

## 2024-09-07 DIAGNOSIS — Z23: ICD-10-CM

## 2024-09-07 DIAGNOSIS — B97.4: ICD-10-CM

## 2024-09-07 DIAGNOSIS — E11.22: ICD-10-CM

## 2024-09-07 DIAGNOSIS — Z90.49: ICD-10-CM

## 2024-09-07 DIAGNOSIS — I27.20: ICD-10-CM

## 2024-09-07 DIAGNOSIS — J96.21: ICD-10-CM

## 2024-09-07 DIAGNOSIS — R79.89: ICD-10-CM

## 2024-09-07 DIAGNOSIS — Z79.899: ICD-10-CM

## 2024-09-07 DIAGNOSIS — R53.81: ICD-10-CM

## 2024-09-07 DIAGNOSIS — Z91.041: ICD-10-CM

## 2024-09-07 DIAGNOSIS — Z88.8: ICD-10-CM

## 2024-09-07 DIAGNOSIS — E87.5: ICD-10-CM

## 2024-09-07 DIAGNOSIS — N18.32: ICD-10-CM

## 2024-09-07 DIAGNOSIS — Z90.5: ICD-10-CM

## 2024-09-07 DIAGNOSIS — D64.9: ICD-10-CM

## 2024-09-07 DIAGNOSIS — Q60.0: ICD-10-CM

## 2024-09-07 DIAGNOSIS — Z87.891: ICD-10-CM

## 2024-09-07 DIAGNOSIS — J84.9: Primary | ICD-10-CM

## 2024-09-07 DIAGNOSIS — I13.0: ICD-10-CM

## 2024-09-07 DIAGNOSIS — Z91.018: ICD-10-CM

## 2024-09-07 DIAGNOSIS — I50.32: ICD-10-CM

## 2024-09-07 DIAGNOSIS — Z99.81: ICD-10-CM

## 2024-09-07 LAB
ALBUMIN SERPL-MCNC: 2.7 G/DL (ref 3.4–4.8)
ALP SERPL-CCNC: 100 U/L (ref 40–150)
ALT SERPL-CCNC: 9 U/L (ref 0–55)
ANION GAP SERPL CALC-SCNC: 10 MMOL/L (ref 7–16)
AST SERPL-CCNC: 14 U/L (ref 5–34)
BASOPHILS # BLD: 0 K/MM3 (ref 0–0.2)
BASOPHILS NFR BLD AUTO: 0.2 % (ref 0–2)
BILIRUB SERPL-MCNC: 0.3 MG/DL (ref 0.2–1.2)
BUN SERPL-MCNC: 59 MG/DL (ref 10–20)
CALCIUM SERPL-MCNC: 9 MG/DL (ref 8.4–10.2)
CHLORIDE SERPL-SCNC: 99 MEQ/L (ref 98–107)
CREAT SERPL-SCNC: 1.69 MG/DL (ref 0.57–1.11)
EOSINOPHIL # BLD: 0.1 K/MM3 (ref 0–0.7)
EOSINOPHIL NFR BLD: 1.2 % (ref 0–4)
ERYTHROCYTE [DISTWIDTH] IN BLOOD BY AUTOMATED COUNT: 13.4 % (ref 11.5–14.5)
GLUCOSE SERPL-MCNC: 234 MG/DL (ref 70–99)
GRANULOCYTES # BLD AUTO: 81.2 % (ref 42.2–75.2)
HCT VFR BLD AUTO: 28 % (ref 37–47)
HGB BLD-MCNC: 8.5 G/DL (ref 12.5–16)
LYMPHOCYTES # BLD: 0.8 K/MM3 (ref 1.2–3.4)
LYMPHOCYTES NFR BLD: 7.1 % (ref 20–51)
MCH RBC QN AUTO: 29 PG (ref 27–31)
MCHC RBC AUTO-ENTMCNC: 30 G/DL (ref 33–37)
MCV RBC AUTO: 96 FL (ref 80–100)
MONOCYTES # BLD: 1.1 K/MM3 (ref 0.1–0.6)
MONOCYTES NFR BLD AUTO: 9.7 % (ref 1.7–9.3)
NEUTROPHILS # BLD: 8.8 K/MM3 (ref 1.4–6.5)
PLATELET # BLD AUTO: 332 K/MM3 (ref 130–400)
PMV BLD AUTO: 10.7 FL (ref 7.4–10.4)
POTASSIUM SERPL-SCNC: 4.5 MEQ/L (ref 3.5–4.5)
PROT SERPL-MCNC: 6.7 G/DL (ref 6.2–8.1)
RBC # BLD AUTO: 2.91 M/MM3 (ref 4.1–5.3)
SODIUM SERPL-SCNC: 139 MEQ/L (ref 136–145)
TROPONIN I SERPL-MCNC: 0.12 NG/ML (ref 0–0.03)

## 2024-09-07 PROCEDURE — P9016 RBC LEUKOCYTES REDUCED: HCPCS

## 2024-09-07 NOTE — NUR
SW met with patient and son Munir ([phone]) to complete initial
assessment for discharge planning.  Patient known to this SW from previous
admissions, and was just discharged from this hospital on Monday 9/2.  Patient
verified that she continues to live alone with her dog in [place] with her
nephew Rosalinda ([phone]) living across the street.  Patient's son and nephew
are named as DPOA and are available to assist patient when needed.  Patient
sees Dr. Fisher as her PCP and uses [facility] pharmacy.  She has oxygen
provided by [employer].  Patient reports to be independent at home with
cooking and cleaning her home  Son reported that patient had two falls on
Monday night.  Discussed possible discharge needs of HH or SNF depending on
therapy evaluations.  Patient does not want either and hopes to return home
independently.
 
Discharge plan:  Home vs HH vs SNf

## 2024-09-07 NOTE — NUR
Patient arrived to the medical unit by wheelchair. Alert and orieted x 4. VSS.
getting 2L O2 NC. Some  SBP. Telemetry in place. Assessment intake
completed.

## 2024-09-07 NOTE — NUR
Initial shift assessment done- VSS, Up to bathroom with standby assist- steady
on feet, Tele on, o2 2L/nc sats 98%. Will call for assistance up, scd,s on.

## 2024-09-08 VITALS — SYSTOLIC BLOOD PRESSURE: 138 MMHG | HEART RATE: 82 BPM | TEMPERATURE: 98.6 F | DIASTOLIC BLOOD PRESSURE: 71 MMHG

## 2024-09-08 VITALS — DIASTOLIC BLOOD PRESSURE: 62 MMHG | SYSTOLIC BLOOD PRESSURE: 147 MMHG | TEMPERATURE: 98.6 F | HEART RATE: 79 BPM

## 2024-09-08 VITALS — SYSTOLIC BLOOD PRESSURE: 138 MMHG

## 2024-09-08 VITALS — DIASTOLIC BLOOD PRESSURE: 68 MMHG | TEMPERATURE: 99.1 F | HEART RATE: 70 BPM | SYSTOLIC BLOOD PRESSURE: 114 MMHG

## 2024-09-08 VITALS — SYSTOLIC BLOOD PRESSURE: 114 MMHG

## 2024-09-08 VITALS — TEMPERATURE: 98 F | HEART RATE: 75 BPM | SYSTOLIC BLOOD PRESSURE: 134 MMHG | DIASTOLIC BLOOD PRESSURE: 69 MMHG

## 2024-09-08 VITALS — SYSTOLIC BLOOD PRESSURE: 127 MMHG | HEART RATE: 68 BPM | DIASTOLIC BLOOD PRESSURE: 52 MMHG | TEMPERATURE: 97.9 F

## 2024-09-08 VITALS — SYSTOLIC BLOOD PRESSURE: 127 MMHG

## 2024-09-08 VITALS — SYSTOLIC BLOOD PRESSURE: 125 MMHG | HEART RATE: 71 BPM | DIASTOLIC BLOOD PRESSURE: 67 MMHG | TEMPERATURE: 98.2 F

## 2024-09-08 VITALS — SYSTOLIC BLOOD PRESSURE: 134 MMHG

## 2024-09-08 VITALS — SYSTOLIC BLOOD PRESSURE: 147 MMHG

## 2024-09-08 LAB
ALBUMIN SERPL-MCNC: 2.3 G/DL (ref 3.4–4.8)
ANION GAP SERPL CALC-SCNC: 7 MMOL/L (ref 7–16)
BASOPHILS # BLD: 0 K/MM3 (ref 0–0.2)
BASOPHILS NFR BLD AUTO: 0.3 % (ref 0–2)
BUN SERPL-MCNC: 45 MG/DL (ref 10–20)
CALCIUM SERPL-MCNC: 9 MG/DL (ref 8.4–10.2)
CHLORIDE SERPL-SCNC: 102 MEQ/L (ref 98–107)
CREAT SERPL-SCNC: 1.59 MG/DL (ref 0.57–1.11)
EOSINOPHIL # BLD: 0.1 K/MM3 (ref 0–0.7)
EOSINOPHIL NFR BLD: 1.6 % (ref 0–4)
ERYTHROCYTE [DISTWIDTH] IN BLOOD BY AUTOMATED COUNT: 13.2 % (ref 11.5–14.5)
GLUCOSE SERPL-MCNC: 150 MG/DL (ref 70–99)
GRANULOCYTES # BLD AUTO: 75.8 % (ref 42.2–75.2)
HCT VFR BLD AUTO: 23.3 % (ref 37–47)
HGB BLD-MCNC: 7.4 G/DL (ref 12.5–16)
LYMPHOCYTES # BLD: 0.8 K/MM3 (ref 1.2–3.4)
LYMPHOCYTES NFR BLD: 9.9 % (ref 20–51)
MAGNESIUM SERPL-MCNC: 2.2 MG/DL (ref 1.6–2.6)
MCH RBC QN AUTO: 29 PG (ref 27–31)
MCHC RBC AUTO-ENTMCNC: 32 G/DL (ref 33–37)
MCV RBC AUTO: 92 FL (ref 80–100)
MONOCYTES # BLD: 0.9 K/MM3 (ref 0.1–0.6)
MONOCYTES NFR BLD AUTO: 11.9 % (ref 1.7–9.3)
NEUTROPHILS # BLD: 5.8 K/MM3 (ref 1.4–6.5)
PHOSPHATE SERPL-MCNC: 3.2 MG/DL (ref 2.3–4.7)
PLATELET # BLD AUTO: 270 K/MM3 (ref 130–400)
PMV BLD AUTO: 10.6 FL (ref 7.4–10.4)
POTASSIUM SERPL-SCNC: 5.2 MEQ/L (ref 3.5–4.5)
RBC # BLD AUTO: 2.53 M/MM3 (ref 4.1–5.3)
SODIUM SERPL-SCNC: 140 MEQ/L (ref 136–145)

## 2024-09-08 NOTE — NUR
states did sleep good last night- no requests, VSS, o2 sats on 2L 96%. Up to
bathroom ,steady on feet at this time.

## 2024-09-08 NOTE — NUR
Patient resting in bed, updated about POC. She ate all her dinner. Report will
be given to night RN.

## 2024-09-08 NOTE — NUR
Patient resting in bed, alert and oriented x 4, states BLE improved. Continues
with 2L O2 NC. Telemetry in place. Assessment completed. Meds given. No
further needs at this time. Call light within reach.

## 2024-09-08 NOTE — NUR
Call placed to Dr. Harris to clarify about lexiscan for patient tomorrow. He
stated he won't do it. Reported to Dr. Green, and patient updated.

## 2024-09-09 VITALS — DIASTOLIC BLOOD PRESSURE: 70 MMHG | HEART RATE: 71 BPM | TEMPERATURE: 98.3 F | SYSTOLIC BLOOD PRESSURE: 146 MMHG

## 2024-09-09 VITALS — HEART RATE: 81 BPM | TEMPERATURE: 98 F | DIASTOLIC BLOOD PRESSURE: 72 MMHG | SYSTOLIC BLOOD PRESSURE: 151 MMHG

## 2024-09-09 VITALS — SYSTOLIC BLOOD PRESSURE: 156 MMHG | HEART RATE: 70 BPM | DIASTOLIC BLOOD PRESSURE: 52 MMHG | TEMPERATURE: 97.8 F

## 2024-09-09 VITALS — TEMPERATURE: 98 F | DIASTOLIC BLOOD PRESSURE: 68 MMHG | HEART RATE: 70 BPM | SYSTOLIC BLOOD PRESSURE: 124 MMHG

## 2024-09-09 VITALS — SYSTOLIC BLOOD PRESSURE: 151 MMHG

## 2024-09-09 VITALS — DIASTOLIC BLOOD PRESSURE: 73 MMHG | HEART RATE: 78 BPM | SYSTOLIC BLOOD PRESSURE: 157 MMHG | TEMPERATURE: 97.9 F

## 2024-09-09 VITALS — DIASTOLIC BLOOD PRESSURE: 61 MMHG | TEMPERATURE: 98.1 F | HEART RATE: 70 BPM | SYSTOLIC BLOOD PRESSURE: 124 MMHG

## 2024-09-09 VITALS — SYSTOLIC BLOOD PRESSURE: 127 MMHG | TEMPERATURE: 98 F | HEART RATE: 72 BPM | DIASTOLIC BLOOD PRESSURE: 63 MMHG

## 2024-09-09 VITALS — HEART RATE: 68 BPM | DIASTOLIC BLOOD PRESSURE: 54 MMHG | SYSTOLIC BLOOD PRESSURE: 142 MMHG | TEMPERATURE: 98.1 F

## 2024-09-09 VITALS — HEART RATE: 71 BPM | DIASTOLIC BLOOD PRESSURE: 68 MMHG | SYSTOLIC BLOOD PRESSURE: 147 MMHG | TEMPERATURE: 98 F

## 2024-09-09 VITALS — SYSTOLIC BLOOD PRESSURE: 125 MMHG

## 2024-09-09 VITALS — SYSTOLIC BLOOD PRESSURE: 124 MMHG

## 2024-09-09 VITALS — SYSTOLIC BLOOD PRESSURE: 127 MMHG

## 2024-09-09 LAB
ALBUMIN SERPL-MCNC: 2.6 G/DL (ref 3.4–4.8)
ANION GAP SERPL CALC-SCNC: 10 MMOL/L (ref 7–16)
BASOPHILS # BLD: 0 K/MM3 (ref 0–0.2)
BASOPHILS NFR BLD AUTO: 0.2 % (ref 0–2)
BUN SERPL-MCNC: 41 MG/DL (ref 10–20)
CALCIUM SERPL-MCNC: 9.3 MG/DL (ref 8.4–10.2)
CHLORIDE SERPL-SCNC: 100 MEQ/L (ref 98–107)
CREAT SERPL-SCNC: 1.55 MG/DL (ref 0.57–1.11)
EOSINOPHIL # BLD: 0.1 K/MM3 (ref 0–0.7)
EOSINOPHIL NFR BLD: 1.2 % (ref 0–4)
ERYTHROCYTE [DISTWIDTH] IN BLOOD BY AUTOMATED COUNT: 13.4 % (ref 11.5–14.5)
GLUCOSE SERPL-MCNC: 136 MG/DL (ref 70–99)
GRANULOCYTES # BLD AUTO: 75.8 % (ref 42.2–75.2)
HCT VFR BLD AUTO: 25.6 % (ref 37–47)
HGB BLD-MCNC: 8 G/DL (ref 12.5–16)
LYMPHOCYTES # BLD: 0.8 K/MM3 (ref 1.2–3.4)
LYMPHOCYTES NFR BLD: 10.1 % (ref 20–51)
MAGNESIUM SERPL-MCNC: 2.1 MG/DL (ref 1.6–2.6)
MCH RBC QN AUTO: 29 PG (ref 27–31)
MCHC RBC AUTO-ENTMCNC: 31 G/DL (ref 33–37)
MCV RBC AUTO: 93 FL (ref 80–100)
MONOCYTES # BLD: 1 K/MM3 (ref 0.1–0.6)
MONOCYTES NFR BLD AUTO: 12.2 % (ref 1.7–9.3)
NEUTROPHILS # BLD: 6.1 K/MM3 (ref 1.4–6.5)
PHOSPHATE SERPL-MCNC: 3.3 MG/DL (ref 2.3–4.7)
PLATELET # BLD AUTO: 293 K/MM3 (ref 130–400)
PMV BLD AUTO: 10.6 FL (ref 7.4–10.4)
POTASSIUM SERPL-SCNC: 4.9 MEQ/L (ref 3.5–4.5)
RBC # BLD AUTO: 2.74 M/MM3 (ref 4.1–5.3)
SODIUM SERPL-SCNC: 140 MEQ/L (ref 136–145)

## 2024-09-09 PROCEDURE — 30233N1 TRANSFUSION OF NONAUTOLOGOUS RED BLOOD CELLS INTO PERIPHERAL VEIN, PERCUTANEOUS APPROACH: ICD-10-PCS | Performed by: INTERNAL MEDICINE

## 2024-09-09 NOTE — NUR
Patient awake, alert and oriented. Denies pain or nausea. Shortness of breath
with exertion but states her breathing is "getting back to normal". In bed
with call light within reach, denies further needs at this time.

## 2024-09-09 NOTE — NUR
Blood transfusion complete. Educated on new medications and discharge
instructions, pt verbalized understanding. Home with home O2. All belongings
sent home with pt. Assisted to car by nursing staff, driven home by family.

## 2024-10-01 ENCOUNTER — HOSPITAL ENCOUNTER (INPATIENT)
Dept: HOSPITAL 19 - COL.ER | Age: 74
LOS: 9 days | Discharge: HOME HEALTH | DRG: 193 | End: 2024-10-10
Attending: HOSPITALIST | Admitting: HOSPITALIST
Payer: MEDICARE

## 2024-10-01 VITALS — SYSTOLIC BLOOD PRESSURE: 142 MMHG | DIASTOLIC BLOOD PRESSURE: 55 MMHG | TEMPERATURE: 98 F | HEART RATE: 74 BPM

## 2024-10-01 VITALS — SYSTOLIC BLOOD PRESSURE: 169 MMHG

## 2024-10-01 VITALS — SYSTOLIC BLOOD PRESSURE: 147 MMHG

## 2024-10-01 VITALS — WEIGHT: 205.25 LBS | HEIGHT: 62.99 IN | BODY MASS INDEX: 36.37 KG/M2

## 2024-10-01 VITALS — SYSTOLIC BLOOD PRESSURE: 171 MMHG | DIASTOLIC BLOOD PRESSURE: 78 MMHG | TEMPERATURE: 98.1 F | HEART RATE: 81 BPM

## 2024-10-01 VITALS — TEMPERATURE: 98.2 F | SYSTOLIC BLOOD PRESSURE: 147 MMHG | DIASTOLIC BLOOD PRESSURE: 65 MMHG | HEART RATE: 80 BPM

## 2024-10-01 VITALS — TEMPERATURE: 97.6 F | SYSTOLIC BLOOD PRESSURE: 169 MMHG | HEART RATE: 87 BPM | DIASTOLIC BLOOD PRESSURE: 74 MMHG

## 2024-10-01 VITALS — SYSTOLIC BLOOD PRESSURE: 142 MMHG

## 2024-10-01 DIAGNOSIS — I27.20: ICD-10-CM

## 2024-10-01 DIAGNOSIS — I12.9: ICD-10-CM

## 2024-10-01 DIAGNOSIS — R53.81: ICD-10-CM

## 2024-10-01 DIAGNOSIS — Z86.79: ICD-10-CM

## 2024-10-01 DIAGNOSIS — Z79.899: ICD-10-CM

## 2024-10-01 DIAGNOSIS — N18.9: ICD-10-CM

## 2024-10-01 DIAGNOSIS — E11.22: ICD-10-CM

## 2024-10-01 DIAGNOSIS — Q60.0: ICD-10-CM

## 2024-10-01 DIAGNOSIS — J96.11: ICD-10-CM

## 2024-10-01 DIAGNOSIS — Z87.891: ICD-10-CM

## 2024-10-01 DIAGNOSIS — Z88.4: ICD-10-CM

## 2024-10-01 DIAGNOSIS — J18.9: Primary | ICD-10-CM

## 2024-10-01 DIAGNOSIS — Z88.6: ICD-10-CM

## 2024-10-01 DIAGNOSIS — Z79.02: ICD-10-CM

## 2024-10-01 DIAGNOSIS — Z88.8: ICD-10-CM

## 2024-10-01 DIAGNOSIS — Z88.5: ICD-10-CM

## 2024-10-01 DIAGNOSIS — I47.20: ICD-10-CM

## 2024-10-01 DIAGNOSIS — D50.0: ICD-10-CM

## 2024-10-01 DIAGNOSIS — J44.9: ICD-10-CM

## 2024-10-01 DIAGNOSIS — I50.23: ICD-10-CM

## 2024-10-01 DIAGNOSIS — Z88.2: ICD-10-CM

## 2024-10-01 LAB
ALBUMIN SERPL-MCNC: 3.5 G/DL (ref 3.4–4.8)
ALP SERPL-CCNC: 113 U/L (ref 40–150)
ALT SERPL-CCNC: 15 U/L (ref 0–55)
AMORPH PHOS CRY # URNS: PRESENT /ML
ANION GAP SERPL CALC-SCNC: 11 MMOL/L (ref 7–16)
APPEARANCE UR: CLEAR
AST SERPL-CCNC: 24 U/L (ref 5–34)
BACTERIA #/AREA URNS HPF: (no result) /HPF
BASOPHILS # BLD: 0 K/MM3 (ref 0–0.2)
BASOPHILS NFR BLD AUTO: 0.3 % (ref 0–2)
BILIRUB SERPL-MCNC: 0.3 MG/DL (ref 0.2–1.2)
BUN SERPL-MCNC: 54 MG/DL (ref 10–20)
CALCIUM SERPL-MCNC: 10 MG/DL (ref 8.4–10.2)
CHLORIDE SERPL-SCNC: 107 MEQ/L (ref 98–107)
COLOR UR AUTO: YELLOW
CREAT SERPL-SCNC: 1.88 MG/DL (ref 0.57–1.11)
EOSINOPHIL # BLD: 0.1 K/MM3 (ref 0–0.7)
EOSINOPHIL NFR BLD: 1 % (ref 0–4)
ERYTHROCYTE [DISTWIDTH] IN BLOOD BY AUTOMATED COUNT: 14.1 % (ref 11.5–14.5)
GLUCOSE SERPL-MCNC: 168 MG/DL (ref 70–99)
GLUCOSE UR QL STRIP.AUTO: (no result)
GRANULOCYTES # BLD AUTO: 73.6 % (ref 42.2–75.2)
HCT VFR BLD AUTO: 31 % (ref 37–47)
HGB BLD-MCNC: 9.3 G/DL (ref 12.5–16)
INR BLD: 1.1 (ref 0.8–3)
KETONES UR STRIP.AUTO-MCNC: NEGATIVE MG/DL
LYMPHOCYTES # BLD: 1.3 K/MM3 (ref 1.2–3.4)
LYMPHOCYTES NFR BLD: 14.6 % (ref 20–51)
MCH RBC QN AUTO: 29 PG (ref 27–31)
MCHC RBC AUTO-ENTMCNC: 30 G/DL (ref 33–37)
MCV RBC AUTO: 98 FL (ref 80–100)
MONOCYTES # BLD: 0.9 K/MM3 (ref 0.1–0.6)
MONOCYTES NFR BLD AUTO: 10.2 % (ref 1.7–9.3)
MUCOUS THREADS #/AREA URNS LPF: PRESENT /[LPF]
NEUTROPHILS # BLD: 6.4 K/MM3 (ref 1.4–6.5)
NITRATE UR-MCNC: NEGATIVE UG/ML
PH UR STRIP.AUTO: 5 [PH] (ref 5–8.5)
PLATELET # BLD AUTO: 209 K/MM3 (ref 130–400)
PMV BLD AUTO: 11.8 FL (ref 7.4–10.4)
POTASSIUM SERPL-SCNC: 4.6 MEQ/L (ref 3.5–4.5)
PROT SERPL-MCNC: 7.4 G/DL (ref 6.2–8.1)
PROTHROMBIN TIME: 12.4 SECONDS (ref 9.7–12.8)
RBC # BLD AUTO: 3.18 M/MM3 (ref 4.1–5.3)
RBC # UR STRIP.AUTO: (no result) /UL
RBC # UR: (no result) /HPF (ref 0–2)
SODIUM SERPL-SCNC: 143 MEQ/L (ref 136–145)
SP GR UR STRIP.AUTO: 1.01 (ref 1–1.03)
SQUAMOUS # URNS: (no result) /HPF (ref 0–10)
TROPONIN I SERPL-MCNC: 0.03 NG/ML (ref 0–0.03)
UA DIPSTICK PNL UR STRIP.AUTO: (no result)
URN COLLECT METHOD CLASS: (no result)
UROBILINOGEN UR STRIP.AUTO-MCNC: 0.2 E.U/DL (ref 0.2–1)
WBC # UR: (no result) /HPF (ref 0–2)

## 2024-10-01 PROCEDURE — P9016 RBC LEUKOCYTES REDUCED: HCPCS

## 2024-10-01 PROCEDURE — G0378 HOSPITAL OBSERVATION PER HR: HCPCS

## 2024-10-01 PROCEDURE — P9047 ALBUMIN (HUMAN), 25%, 50ML: HCPCS

## 2024-10-01 NOTE — NUR
April REBOLLEDO called with EKG results of afib, also informed of troponin of
0.253. No new orders at this time.

## 2024-10-01 NOTE — NUR
PATIENT ARRIVED TO MEDICAL UNIT AT APPROX 1130. PATIENT IS ALERT AND ORIENTED.
PATIENT AMBULATED TO RESTROOM WELL. ADMISSION ASSESSMENT AND INTAKE COMPLETE.
BLE EDEMA AND BLISTERS NOTED TO BLE. DENIES PAIN, BUT STATES SHE FEELS
DISCOMFORT IN HER LEGS D/T SWELLING. PATIENT STATES SHE HAS BEEN TAKING A
BOWEL PREP FOR HER CAPSULE ENDOSCOPY SHE WAS SUPPOSED TO DO TODAY OR TOMORROW.
VSS. PATIENT STABLE ON 2L VIA NC, WHICH IS HER BASELINE. THIS RN ASSESSED
PATIENT'S GAIT AND PATIENT IS ABLE TO WALK TO BATHROOM BY HERSELF. HOWEVER,
THIS RN ADVISED PATIENT TO CALL FOR HELP IF SHE FEELS WEAK, SOA, ETC. PATIENT
AGREES. CALL LIGHT WITHIN REACH.

## 2024-10-01 NOTE — NUR
Vancomycin Initial Dosing Pharmacy Note
Ordering provider: Anh Hill E., MD
Indication/duration: PNA, 7 days
LABS: SCr 1.88, CrCl~26, GFR 28
Recommendation: Will start Vancomycin 1 gm IV q24h. Pharmacy will continue to
closely monitor and check a trough on 10/4/24.
Maintenance dose: 1 gram every 24 hours
Trough goal: 15-20 ug/mL

## 2024-10-01 NOTE — NUR
Initial shift assessment done- was called by Tele that pt was in afib, rate
88/min, pt states does not have a history of Afib but has history of
"irregular" heart beat. Order put in for a 12 lead EKG per protocol. Pt denies
pain, o2 at 2L/nc with sats of 95%, Up to bathroom  with standby assist, back
to bed- lab here to draw the 6 hr troponin level.

## 2024-10-02 VITALS — TEMPERATURE: 98.7 F | HEART RATE: 81 BPM | DIASTOLIC BLOOD PRESSURE: 63 MMHG | SYSTOLIC BLOOD PRESSURE: 155 MMHG

## 2024-10-02 VITALS — HEART RATE: 77 BPM | DIASTOLIC BLOOD PRESSURE: 69 MMHG | SYSTOLIC BLOOD PRESSURE: 145 MMHG | TEMPERATURE: 97.5 F

## 2024-10-02 VITALS — TEMPERATURE: 98.4 F | DIASTOLIC BLOOD PRESSURE: 69 MMHG | SYSTOLIC BLOOD PRESSURE: 156 MMHG | HEART RATE: 75 BPM

## 2024-10-02 VITALS — SYSTOLIC BLOOD PRESSURE: 145 MMHG

## 2024-10-02 VITALS — SYSTOLIC BLOOD PRESSURE: 156 MMHG

## 2024-10-02 VITALS — SYSTOLIC BLOOD PRESSURE: 122 MMHG

## 2024-10-02 VITALS — TEMPERATURE: 98.4 F | HEART RATE: 72 BPM | DIASTOLIC BLOOD PRESSURE: 66 MMHG | SYSTOLIC BLOOD PRESSURE: 122 MMHG

## 2024-10-02 VITALS — SYSTOLIC BLOOD PRESSURE: 117 MMHG

## 2024-10-02 VITALS — DIASTOLIC BLOOD PRESSURE: 62 MMHG | HEART RATE: 75 BPM | SYSTOLIC BLOOD PRESSURE: 117 MMHG | TEMPERATURE: 98.1 F

## 2024-10-02 LAB
ANION GAP SERPL CALC-SCNC: 10 MMOL/L (ref 7–16)
BASOPHILS # BLD: 0 K/MM3 (ref 0–0.2)
BASOPHILS NFR BLD AUTO: 0.3 % (ref 0–2)
BUN SERPL-MCNC: 45 MG/DL (ref 10–20)
CALCIUM SERPL-MCNC: 9.4 MG/DL (ref 8.4–10.2)
CHLORIDE SERPL-SCNC: 107 MEQ/L (ref 98–107)
CREAT SERPL-SCNC: 1.64 MG/DL (ref 0.57–1.11)
EOSINOPHIL # BLD: 0.1 K/MM3 (ref 0–0.7)
EOSINOPHIL NFR BLD: 0.8 % (ref 0–4)
ERYTHROCYTE [DISTWIDTH] IN BLOOD BY AUTOMATED COUNT: 14.2 % (ref 11.5–14.5)
GLUCOSE SERPL-MCNC: 172 MG/DL (ref 70–99)
GRANULOCYTES # BLD AUTO: 80.9 % (ref 42.2–75.2)
HCT VFR BLD AUTO: 28.8 % (ref 37–47)
HGB BLD-MCNC: 9 G/DL (ref 12.5–16)
LYMPHOCYTES # BLD: 0.8 K/MM3 (ref 1.2–3.4)
LYMPHOCYTES NFR BLD: 8.6 % (ref 20–51)
MCH RBC QN AUTO: 30 PG (ref 27–31)
MCHC RBC AUTO-ENTMCNC: 31 G/DL (ref 33–37)
MCV RBC AUTO: 95 FL (ref 80–100)
MONOCYTES # BLD: 0.8 K/MM3 (ref 0.1–0.6)
MONOCYTES NFR BLD AUTO: 9.1 % (ref 1.7–9.3)
NEUTROPHILS # BLD: 7.3 K/MM3 (ref 1.4–6.5)
PLATELET # BLD AUTO: 193 K/MM3 (ref 130–400)
PMV BLD AUTO: 12.1 FL (ref 7.4–10.4)
POTASSIUM SERPL-SCNC: 4.6 MEQ/L (ref 3.5–4.5)
RBC # BLD AUTO: 3.02 M/MM3 (ref 4.1–5.3)
SODIUM SERPL-SCNC: 145 MEQ/L (ref 136–145)

## 2024-10-02 PROCEDURE — 30233N1 TRANSFUSION OF NONAUTOLOGOUS RED BLOOD CELLS INTO PERIPHERAL VEIN, PERCUTANEOUS APPROACH: ICD-10-PCS | Performed by: INTERNAL MEDICINE

## 2024-10-02 NOTE — NUR
met with pt to discuss discharge planning and notes she has been
admitted as of recent. Pt reports to live alone in [place], [place]. She sees
Dr. Fisher for PCP needs and [facility]
with [facility]. She confirmed to have Medicare [employer] insurance. Pt
reports to be independent with ADLS and uses home oxygen at 2L through [employer] for DME. She does have a DPOA-HC on file, listing her son, Munir
[phone]. SW provided Medicare.gov list of Home Health noting places that
accept her insurance and can come to her address. Pt was not feeling well so
SW said she would come back after therapy sees her.
 
PT/OT pending
 
Discharge Plan: [facility]

## 2024-10-02 NOTE — NUR
Requesting a breathing treatment, states shes hot and SOB, o2 sats 92% on
the 2L/nc, put on 3 L/nc at this time, cool cloth to forehead, respiratory
therapy here to do treatment, Lung sounds decreased in bases with right base
very decreased and coarse crackles.

## 2024-10-02 NOTE — NUR
RN NOTIFIED THIS RT THAT PT IS REQUESTING A PRN TX. PT IS NOW REQUIRING 3L
WITH SATURATIONS AT 92%. PRN GIVEN AT THIS TIME.

## 2024-10-02 NOTE — NUR
UPON SHIFT ASSESSMENT, PATIENT WAS AWAKE IN BED AND A&O X4 WITH FAMILY
VISITING BEDSIDE. LUNGS AUSCULTATED FINE CRACKLES IN RLL. PATIENT EXHIBITED
WALSH AND BLLE HAD WEEPY VESICLES. PATIENT CURRENTLY DENIES ANY PAIN. VS ARE
WNL AND TELE IS NS AT 72 BPM. CALL PLACED TO TELE AS THIS NURSE WAS GIVEN
REPORT THAT LYNN HAD BEEN IN AFIB RATE CONTROL. HOWEVER, PATIENT NOW NS AND
CONVERTED TIME AND DATE UNKNOWN. PATIENT ON 1.5L O2 NC. DENIES CHEST PAIN.
CALL LIGHT WITHIN REACH.

## 2024-10-02 NOTE — NUR
D:  stopped by room on rounds.
 
A:  Pt was resting and content sitting on the edge of her bed with her son in
the room.  Pt has no needs right now.
 
P:   informed pt that if she needed anything from the  area to
let her nurse know.   will follow up as needed.

## 2024-10-03 VITALS — TEMPERATURE: 98.3 F | SYSTOLIC BLOOD PRESSURE: 137 MMHG | DIASTOLIC BLOOD PRESSURE: 70 MMHG | HEART RATE: 68 BPM

## 2024-10-03 VITALS — HEART RATE: 79 BPM | DIASTOLIC BLOOD PRESSURE: 44 MMHG | SYSTOLIC BLOOD PRESSURE: 123 MMHG | TEMPERATURE: 98 F

## 2024-10-03 VITALS — HEART RATE: 76 BPM | DIASTOLIC BLOOD PRESSURE: 61 MMHG | SYSTOLIC BLOOD PRESSURE: 112 MMHG

## 2024-10-03 VITALS — HEART RATE: 63 BPM | DIASTOLIC BLOOD PRESSURE: 50 MMHG | TEMPERATURE: 98.3 F | SYSTOLIC BLOOD PRESSURE: 122 MMHG

## 2024-10-03 VITALS — SYSTOLIC BLOOD PRESSURE: 119 MMHG

## 2024-10-03 VITALS — DIASTOLIC BLOOD PRESSURE: 55 MMHG | TEMPERATURE: 98.9 F | SYSTOLIC BLOOD PRESSURE: 133 MMHG | HEART RATE: 72 BPM

## 2024-10-03 VITALS — HEART RATE: 74 BPM | TEMPERATURE: 98 F | SYSTOLIC BLOOD PRESSURE: 116 MMHG | DIASTOLIC BLOOD PRESSURE: 44 MMHG

## 2024-10-03 VITALS — DIASTOLIC BLOOD PRESSURE: 45 MMHG | SYSTOLIC BLOOD PRESSURE: 122 MMHG | HEART RATE: 72 BPM | TEMPERATURE: 98 F

## 2024-10-03 VITALS — TEMPERATURE: 98.5 F | HEART RATE: 77 BPM | DIASTOLIC BLOOD PRESSURE: 40 MMHG | SYSTOLIC BLOOD PRESSURE: 119 MMHG

## 2024-10-03 VITALS — SYSTOLIC BLOOD PRESSURE: 117 MMHG | DIASTOLIC BLOOD PRESSURE: 67 MMHG | TEMPERATURE: 98.8 F | HEART RATE: 66 BPM

## 2024-10-03 VITALS — TEMPERATURE: 99.1 F | HEART RATE: 82 BPM | DIASTOLIC BLOOD PRESSURE: 65 MMHG | SYSTOLIC BLOOD PRESSURE: 120 MMHG

## 2024-10-03 VITALS — TEMPERATURE: 98.3 F | DIASTOLIC BLOOD PRESSURE: 49 MMHG | SYSTOLIC BLOOD PRESSURE: 123 MMHG | HEART RATE: 65 BPM

## 2024-10-03 VITALS — SYSTOLIC BLOOD PRESSURE: 123 MMHG

## 2024-10-03 VITALS — TEMPERATURE: 98.1 F | SYSTOLIC BLOOD PRESSURE: 126 MMHG | HEART RATE: 75 BPM | DIASTOLIC BLOOD PRESSURE: 38 MMHG

## 2024-10-03 VITALS — HEART RATE: 77 BPM | DIASTOLIC BLOOD PRESSURE: 48 MMHG | SYSTOLIC BLOOD PRESSURE: 120 MMHG | TEMPERATURE: 98.1 F

## 2024-10-03 VITALS — SYSTOLIC BLOOD PRESSURE: 117 MMHG

## 2024-10-03 VITALS — TEMPERATURE: 98 F | SYSTOLIC BLOOD PRESSURE: 123 MMHG | DIASTOLIC BLOOD PRESSURE: 53 MMHG | HEART RATE: 82 BPM

## 2024-10-03 VITALS — SYSTOLIC BLOOD PRESSURE: 126 MMHG

## 2024-10-03 VITALS — SYSTOLIC BLOOD PRESSURE: 120 MMHG

## 2024-10-03 LAB
ANION GAP SERPL CALC-SCNC: 7 MMOL/L (ref 7–16)
BASOPHILS # BLD: 0 K/MM3 (ref 0–0.2)
BASOPHILS NFR BLD AUTO: 0.3 % (ref 0–2)
BUN SERPL-MCNC: 45 MG/DL (ref 10–20)
CALCIUM SERPL-MCNC: 8.7 MG/DL (ref 8.4–10.2)
CHLORIDE SERPL-SCNC: 107 MEQ/L (ref 98–107)
CREAT SERPL-SCNC: 1.78 MG/DL (ref 0.57–1.11)
EOSINOPHIL # BLD: 0.1 K/MM3 (ref 0–0.7)
EOSINOPHIL NFR BLD: 0.9 % (ref 0–4)
ERYTHROCYTE [DISTWIDTH] IN BLOOD BY AUTOMATED COUNT: 14.3 % (ref 11.5–14.5)
GLUCOSE SERPL-MCNC: 134 MG/DL (ref 70–99)
GRANULOCYTES # BLD AUTO: 76.6 % (ref 42.2–75.2)
HCT VFR BLD AUTO: 22.8 % (ref 37–47)
HCT VFR BLD AUTO: 26.8 % (ref 37–47)
HGB BLD-MCNC: 7 G/DL (ref 12.5–16)
HGB BLD-MCNC: 8.4 G/DL (ref 12.5–16)
LYMPHOCYTES # BLD: 0.8 K/MM3 (ref 1.2–3.4)
LYMPHOCYTES NFR BLD: 10.7 % (ref 20–51)
MCH RBC QN AUTO: 30 PG (ref 27–31)
MCHC RBC AUTO-ENTMCNC: 31 G/DL (ref 33–37)
MCV RBC AUTO: 97 FL (ref 80–100)
MONOCYTES # BLD: 0.8 K/MM3 (ref 0.1–0.6)
MONOCYTES NFR BLD AUTO: 11.2 % (ref 1.7–9.3)
NEUTROPHILS # BLD: 5.4 K/MM3 (ref 1.4–6.5)
PLATELET # BLD AUTO: 147 K/MM3 (ref 130–400)
PMV BLD AUTO: 11.8 FL (ref 7.4–10.4)
POTASSIUM SERPL-SCNC: 4.9 MEQ/L (ref 3.5–4.5)
RBC # BLD AUTO: 2.36 M/MM3 (ref 4.1–5.3)
SODIUM SERPL-SCNC: 141 MEQ/L (ref 136–145)

## 2024-10-03 NOTE — NUR
PATIENT BLOOF TRANSFUSION COMPLETE. PATIENT TOLERATED IT WELL. PATIENT CALL
LIGHT WITHIN REACH. BED AT LOWEST POSITION.

## 2024-10-03 NOTE — NUR
This student nurse reported off to prmary day nurse Elizabeth, Patient sitting on
side of bed with bed with bed side table in front of patient, OT present
working with patient. Call light wothin reach.

## 2024-10-03 NOTE — NUR
RECEIVED CHANGE OF SHIFT REPORT FROM DAY SHIFT NURSE. PATIENT REQUESTING TUMS,
REPORTING COMPLAINTS OF HEARTBURN. DENIES CHEST PAIN/SHORTNESS OF AIR AT THIS
TIME.

## 2024-10-03 NOTE — NUR
BLOOD TRANSFUSION STARTED. PATIENT DENIES SOB, CHEST PAIN, DIZZINESS OR
ITCHINESS AT THIS TIME. NURSE REMAINS AT BEDSIDE AT THIS TIME.

## 2024-10-03 NOTE — NUR
REPORTED TUMS GIVEN EARLIER HAD HELPED COMPLAINT OF ACID REFLUX. DENIED ANY
OTHER NEEDS AT THIS TIME.

## 2024-10-03 NOTE — NUR
GIORGI notes PT/OT reccomend Home Health for pt. SW met with pt to follow-up on
her options. She reports she did not feel well yesterday and has not had a
chance to look at the Medicare.gov list provided. GIORGI encouraged her to call
once she decided or if she has any questions. GIORGI discussed the importance of
skilled nursing as well.
 
Discharge Plan: home with HH-- TBD

## 2024-10-03 NOTE — NUR
THIS STUDENT NURSE RECEIVED REPORT FROM NIGHT NURSE SANDY, Patient awake and
alert, laying in bed with call light within reach, assisted patient to sitting
position with feet off side of bed and bed side table in front of patinet. IV
in R FA intact. Denies needs at this time.

## 2024-10-03 NOTE — NUR
PATIENT TOLERATING BLOOD TRANSFUSION WELL.INCREASED RATE PATIENT DENIES
SOB,DIZZINESS, ITCHINESS REMAINS AFEBRILE. DENIES ANY CONCERNA AT THIS TIME.
CALL LIGHT WITHIN REACH. BED AT LOWEST POSITION. FAMILY AT BEDSIDE.

## 2024-10-03 NOTE — NUR
PATIENT REQUESTED TUMS FOR COMPLAINTS OF HEARTBURN. ORDERS GIVEN TO ONE TIME
DOSE OF TUMS 2 TABS, SEE MEDITECH.

## 2024-10-04 VITALS — TEMPERATURE: 97.8 F | DIASTOLIC BLOOD PRESSURE: 47 MMHG | SYSTOLIC BLOOD PRESSURE: 124 MMHG | HEART RATE: 60 BPM

## 2024-10-04 VITALS — SYSTOLIC BLOOD PRESSURE: 138 MMHG

## 2024-10-04 VITALS — HEART RATE: 60 BPM | TEMPERATURE: 98 F | SYSTOLIC BLOOD PRESSURE: 122 MMHG | DIASTOLIC BLOOD PRESSURE: 41 MMHG

## 2024-10-04 VITALS — DIASTOLIC BLOOD PRESSURE: 75 MMHG | TEMPERATURE: 98.4 F | SYSTOLIC BLOOD PRESSURE: 144 MMHG | HEART RATE: 88 BPM

## 2024-10-04 VITALS — SYSTOLIC BLOOD PRESSURE: 155 MMHG

## 2024-10-04 VITALS — HEART RATE: 70 BPM | SYSTOLIC BLOOD PRESSURE: 138 MMHG | TEMPERATURE: 98.4 F | DIASTOLIC BLOOD PRESSURE: 74 MMHG

## 2024-10-04 VITALS — SYSTOLIC BLOOD PRESSURE: 135 MMHG | DIASTOLIC BLOOD PRESSURE: 49 MMHG | TEMPERATURE: 98.1 F | HEART RATE: 63 BPM

## 2024-10-04 VITALS — HEART RATE: 68 BPM | DIASTOLIC BLOOD PRESSURE: 65 MMHG | SYSTOLIC BLOOD PRESSURE: 134 MMHG | TEMPERATURE: 98.7 F

## 2024-10-04 VITALS — SYSTOLIC BLOOD PRESSURE: 124 MMHG

## 2024-10-04 VITALS — TEMPERATURE: 98.1 F | DIASTOLIC BLOOD PRESSURE: 49 MMHG | SYSTOLIC BLOOD PRESSURE: 138 MMHG | HEART RATE: 65 BPM

## 2024-10-04 VITALS — DIASTOLIC BLOOD PRESSURE: 63 MMHG | HEART RATE: 62 BPM | TEMPERATURE: 98.2 F | SYSTOLIC BLOOD PRESSURE: 136 MMHG

## 2024-10-04 VITALS — SYSTOLIC BLOOD PRESSURE: 155 MMHG | DIASTOLIC BLOOD PRESSURE: 44 MMHG | TEMPERATURE: 98.5 F | HEART RATE: 72 BPM

## 2024-10-04 VITALS — SYSTOLIC BLOOD PRESSURE: 137 MMHG

## 2024-10-04 VITALS — DIASTOLIC BLOOD PRESSURE: 41 MMHG | HEART RATE: 60 BPM | SYSTOLIC BLOOD PRESSURE: 130 MMHG | TEMPERATURE: 98.1 F

## 2024-10-04 VITALS — SYSTOLIC BLOOD PRESSURE: 144 MMHG

## 2024-10-04 LAB
ANION GAP SERPL CALC-SCNC: 10 MMOL/L (ref 7–16)
BASOPHILS # BLD: 0 K/MM3 (ref 0–0.2)
BASOPHILS NFR BLD AUTO: 0.3 % (ref 0–2)
BUN SERPL-MCNC: 45 MG/DL (ref 10–20)
CALCIUM SERPL-MCNC: 8.7 MG/DL (ref 8.4–10.2)
CHLORIDE SERPL-SCNC: 103 MEQ/L (ref 98–107)
CREAT SERPL-SCNC: 1.88 MG/DL (ref 0.57–1.11)
EOSINOPHIL # BLD: 0.1 K/MM3 (ref 0–0.7)
EOSINOPHIL NFR BLD: 1.6 % (ref 0–4)
ERYTHROCYTE [DISTWIDTH] IN BLOOD BY AUTOMATED COUNT: 15.6 % (ref 11.5–14.5)
GLUCOSE SERPL-MCNC: 134 MG/DL (ref 70–99)
GRANULOCYTES # BLD AUTO: 72.3 % (ref 42.2–75.2)
HCT VFR BLD AUTO: 24.7 % (ref 37–47)
HCT VFR BLD AUTO: 28.4 % (ref 37–47)
HGB BLD-MCNC: 7.8 G/DL (ref 12.5–16)
HGB BLD-MCNC: 9.1 G/DL (ref 12.5–16)
LYMPHOCYTES # BLD: 0.8 K/MM3 (ref 1.2–3.4)
LYMPHOCYTES NFR BLD: 12.3 % (ref 20–51)
MCH RBC QN AUTO: 29 PG (ref 27–31)
MCHC RBC AUTO-ENTMCNC: 32 G/DL (ref 33–37)
MCV RBC AUTO: 93 FL (ref 80–100)
MONOCYTES # BLD: 0.9 K/MM3 (ref 0.1–0.6)
MONOCYTES NFR BLD AUTO: 13.1 % (ref 1.7–9.3)
NEUTROPHILS # BLD: 4.9 K/MM3 (ref 1.4–6.5)
PLATELET # BLD AUTO: 149 K/MM3 (ref 130–400)
PMV BLD AUTO: 12.3 FL (ref 7.4–10.4)
POTASSIUM SERPL-SCNC: 4.6 MEQ/L (ref 3.5–4.5)
RBC # BLD AUTO: 2.67 M/MM3 (ref 4.1–5.3)
SODIUM SERPL-SCNC: 139 MEQ/L (ref 136–145)

## 2024-10-04 NOTE — NUR
attended clinical rounding and was informed pt can discharge
over the weekend likely. SW met with pt to offer and provide information on
SNF. Pt declined and states, "I am not going to a [facility] nursing home." SW
followed up on HH and she began to decline. SW provided it would be benefical
for a nurse to see her in the home. She will reconsider and call SW once she
decides from the medicare.gov list.
 
Discharge Plan: home with HH-- TBD

## 2024-10-04 NOTE — NUR
PER NURSING STAFF, PATIENT REQUESTED TO HAVE PARAM ACE WRAPS REMOVED FROM BLE
"TO GIVE PATIENT A BREAK" AS REQUESTED BY PATIENT.

## 2024-10-04 NOTE — NUR
CONTACT ONCALL PROVIDER FOR PATIENT'S REQUEST FOR TUMS, TORB FOR TUMS 500 MG
PO EVERY 6 HOURS AS NEEDED FOR INDIGESTION, SEE Agent Partner FOR ORDER ENTERED.

## 2024-10-04 NOTE — NUR
THIS RN IS RESUMING CARE OF THIS PATIENT AT THIS TIME. PATIENT RESTING IN BED,
DENIES ANY NEEDS AT THIS TIME. BED ALARM IN PLACE, CALL LIGHT WITHIN REACH.

## 2024-10-04 NOTE — NUR
RECEIVED CHANGE OF SHIFT REPORT FROM DAY SHIFT NURSE. PATIENT SITTING AT SIDE
OF BED, FAMILY PRESENT IN ROOM. OXYGEN CONTINUES PER NC. PATIENT DENIES CHEST
PAIN/SHORTNESS OF BREATH/NAUSEA. IS COMPLAINING OF INDIGESTION AND WANTING
TUMS.

## 2024-10-05 VITALS — DIASTOLIC BLOOD PRESSURE: 67 MMHG | TEMPERATURE: 98.1 F | SYSTOLIC BLOOD PRESSURE: 148 MMHG | HEART RATE: 58 BPM

## 2024-10-05 VITALS — DIASTOLIC BLOOD PRESSURE: 58 MMHG | HEART RATE: 58 BPM | TEMPERATURE: 98.1 F | SYSTOLIC BLOOD PRESSURE: 128 MMHG

## 2024-10-05 VITALS — SYSTOLIC BLOOD PRESSURE: 159 MMHG | TEMPERATURE: 98.1 F | DIASTOLIC BLOOD PRESSURE: 73 MMHG | HEART RATE: 66 BPM

## 2024-10-05 VITALS — DIASTOLIC BLOOD PRESSURE: 82 MMHG | SYSTOLIC BLOOD PRESSURE: 134 MMHG | TEMPERATURE: 98.2 F | HEART RATE: 86 BPM

## 2024-10-05 VITALS — SYSTOLIC BLOOD PRESSURE: 148 MMHG

## 2024-10-05 VITALS — SYSTOLIC BLOOD PRESSURE: 149 MMHG | DIASTOLIC BLOOD PRESSURE: 76 MMHG | TEMPERATURE: 97.9 F | HEART RATE: 58 BPM

## 2024-10-05 VITALS — TEMPERATURE: 98 F | SYSTOLIC BLOOD PRESSURE: 124 MMHG | HEART RATE: 66 BPM | DIASTOLIC BLOOD PRESSURE: 69 MMHG

## 2024-10-05 VITALS — HEART RATE: 59 BPM | DIASTOLIC BLOOD PRESSURE: 69 MMHG | TEMPERATURE: 98.1 F | SYSTOLIC BLOOD PRESSURE: 133 MMHG

## 2024-10-05 VITALS — SYSTOLIC BLOOD PRESSURE: 159 MMHG

## 2024-10-05 VITALS — SYSTOLIC BLOOD PRESSURE: 149 MMHG

## 2024-10-05 VITALS — SYSTOLIC BLOOD PRESSURE: 128 MMHG

## 2024-10-05 VITALS — SYSTOLIC BLOOD PRESSURE: 134 MMHG

## 2024-10-05 LAB
ALBUMIN SERPL-MCNC: 2.8 G/DL (ref 3.4–4.8)
ALP SERPL-CCNC: 88 U/L (ref 40–150)
ALT SERPL-CCNC: 7 U/L (ref 0–55)
ANION GAP SERPL CALC-SCNC: 9 MMOL/L (ref 7–16)
AST SERPL-CCNC: 12 U/L (ref 5–34)
BASOPHILS # BLD: 0 K/MM3 (ref 0–0.2)
BASOPHILS NFR BLD AUTO: 0.3 % (ref 0–2)
BILIRUB SERPL-MCNC: 0.3 MG/DL (ref 0.2–1.2)
BUN SERPL-MCNC: 46 MG/DL (ref 10–20)
CALCIUM SERPL-MCNC: 9 MG/DL (ref 8.4–10.2)
CHLORIDE SERPL-SCNC: 101 MEQ/L (ref 98–107)
CREAT SERPL-SCNC: 1.93 MG/DL (ref 0.57–1.11)
EOSINOPHIL # BLD: 0.1 K/MM3 (ref 0–0.7)
EOSINOPHIL NFR BLD: 1 % (ref 0–4)
ERYTHROCYTE [DISTWIDTH] IN BLOOD BY AUTOMATED COUNT: 15 % (ref 11.5–14.5)
GLUCOSE SERPL-MCNC: 138 MG/DL (ref 70–99)
GRANULOCYTES # BLD AUTO: 75.9 % (ref 42.2–75.2)
HCT VFR BLD AUTO: 28.3 % (ref 37–47)
HGB BLD-MCNC: 8.9 G/DL (ref 12.5–16)
LYMPHOCYTES # BLD: 0.8 K/MM3 (ref 1.2–3.4)
LYMPHOCYTES NFR BLD: 11.3 % (ref 20–51)
MCH RBC QN AUTO: 29 PG (ref 27–31)
MCHC RBC AUTO-ENTMCNC: 31 G/DL (ref 33–37)
MCV RBC AUTO: 93 FL (ref 80–100)
MONOCYTES # BLD: 0.8 K/MM3 (ref 0.1–0.6)
MONOCYTES NFR BLD AUTO: 11.2 % (ref 1.7–9.3)
NEUTROPHILS # BLD: 5.1 K/MM3 (ref 1.4–6.5)
PLATELET # BLD AUTO: 159 K/MM3 (ref 130–400)
PMV BLD AUTO: 11.6 FL (ref 7.4–10.4)
POTASSIUM SERPL-SCNC: 4.2 MEQ/L (ref 3.5–4.5)
PROT SERPL-MCNC: 5.8 G/DL (ref 6.2–8.1)
RBC # BLD AUTO: 3.06 M/MM3 (ref 4.1–5.3)
SODIUM SERPL-SCNC: 140 MEQ/L (ref 136–145)

## 2024-10-05 NOTE — NUR
PATIENT RESTING IN BED UPON ENTERING ROOM. MORNING MEDICATIONS ADMINISTERED
PER eMAR. PATIENT DENIES ANY PAIN OR NEEDS AT THIS TIME. CURRENTLY ON BASELINE
2L O2. BED ALARM IN PLACE AND CALL LIGHT WITHIN REACH. UPDATED PATIENT ON PLAN
OF CARE. WILL CONTINUE TO MONITOR.

## 2024-10-05 NOTE — NUR
met with patient to inquire about her decision regarding a Home
Health agency. Patient stated that she reviewed the Medicare.gov list and that
she "does not want any of them." When asked, patient stated that she is not
interested in any home health services and does not want any referrals made.
 
Plan: D/C home.

## 2024-10-06 VITALS — DIASTOLIC BLOOD PRESSURE: 71 MMHG | SYSTOLIC BLOOD PRESSURE: 147 MMHG | TEMPERATURE: 97.6 F | HEART RATE: 59 BPM

## 2024-10-06 VITALS — SYSTOLIC BLOOD PRESSURE: 131 MMHG

## 2024-10-06 VITALS — DIASTOLIC BLOOD PRESSURE: 70 MMHG | SYSTOLIC BLOOD PRESSURE: 134 MMHG | HEART RATE: 54 BPM | TEMPERATURE: 98.2 F

## 2024-10-06 VITALS — SYSTOLIC BLOOD PRESSURE: 157 MMHG

## 2024-10-06 VITALS — SYSTOLIC BLOOD PRESSURE: 131 MMHG | TEMPERATURE: 97.9 F | DIASTOLIC BLOOD PRESSURE: 69 MMHG | HEART RATE: 60 BPM

## 2024-10-06 VITALS — TEMPERATURE: 98 F | SYSTOLIC BLOOD PRESSURE: 157 MMHG | HEART RATE: 76 BPM | DIASTOLIC BLOOD PRESSURE: 73 MMHG

## 2024-10-06 VITALS — DIASTOLIC BLOOD PRESSURE: 65 MMHG | TEMPERATURE: 98.3 F | SYSTOLIC BLOOD PRESSURE: 124 MMHG | HEART RATE: 56 BPM

## 2024-10-06 VITALS — SYSTOLIC BLOOD PRESSURE: 124 MMHG

## 2024-10-06 VITALS — SYSTOLIC BLOOD PRESSURE: 147 MMHG

## 2024-10-06 VITALS — SYSTOLIC BLOOD PRESSURE: 133 MMHG

## 2024-10-06 LAB
ALBUMIN SERPL-MCNC: 3.9 G/DL (ref 3.4–4.8)
ALP SERPL-CCNC: 80 U/L (ref 40–150)
ALT SERPL-CCNC: 6 U/L (ref 0–55)
ANION GAP SERPL CALC-SCNC: 12 MMOL/L (ref 7–16)
AST SERPL-CCNC: 14 U/L (ref 5–34)
BASOPHILS # BLD: 0 K/MM3 (ref 0–0.2)
BASOPHILS NFR BLD AUTO: 0.3 % (ref 0–2)
BILIRUB SERPL-MCNC: 0.4 MG/DL (ref 0.2–1.2)
BUN SERPL-MCNC: 51 MG/DL (ref 10–20)
CALCIUM SERPL-MCNC: 9.5 MG/DL (ref 8.4–10.2)
CHLORIDE SERPL-SCNC: 100 MEQ/L (ref 98–107)
CREAT SERPL-SCNC: 2 MG/DL (ref 0.57–1.11)
EOSINOPHIL # BLD: 0.1 K/MM3 (ref 0–0.7)
EOSINOPHIL NFR BLD: 1.2 % (ref 0–4)
ERYTHROCYTE [DISTWIDTH] IN BLOOD BY AUTOMATED COUNT: 14.6 % (ref 11.5–14.5)
GLUCOSE SERPL-MCNC: 127 MG/DL (ref 70–99)
GRANULOCYTES # BLD AUTO: 75.4 % (ref 42.2–75.2)
HCT VFR BLD AUTO: 26.6 % (ref 37–47)
HGB BLD-MCNC: 8.6 G/DL (ref 12.5–16)
LYMPHOCYTES # BLD: 0.8 K/MM3 (ref 1.2–3.4)
LYMPHOCYTES NFR BLD: 11.2 % (ref 20–51)
MCH RBC QN AUTO: 29 PG (ref 27–31)
MCHC RBC AUTO-ENTMCNC: 32 G/DL (ref 33–37)
MCV RBC AUTO: 91 FL (ref 80–100)
MONOCYTES # BLD: 0.9 K/MM3 (ref 0.1–0.6)
MONOCYTES NFR BLD AUTO: 11.6 % (ref 1.7–9.3)
NEUTROPHILS # BLD: 5.5 K/MM3 (ref 1.4–6.5)
PLATELET # BLD AUTO: 156 K/MM3 (ref 130–400)
PMV BLD AUTO: 12 FL (ref 7.4–10.4)
POTASSIUM SERPL-SCNC: 4 MEQ/L (ref 3.5–4.5)
PROT SERPL-MCNC: 6.5 G/DL (ref 6.2–8.1)
RBC # BLD AUTO: 2.92 M/MM3 (ref 4.1–5.3)
SODIUM SERPL-SCNC: 143 MEQ/L (ref 136–145)

## 2024-10-06 NOTE — NUR
PATIENT SITTING UP ON SIDE OF BED UPON ENTERING ROOM. MORNING MEDICATIONS
ADMINISTERED PER eMAR. LASIX GTT INFUSING. PATIENT HAS BEEN USING THE BATHROOM
FREQUENTLY TO URINATE. PATIENT DENIES ANY PAIN AT THIS TIME. CALL LIGHT WITHIN
REACH, BED ALARM IN PLACE.

## 2024-10-07 VITALS — TEMPERATURE: 98.7 F | SYSTOLIC BLOOD PRESSURE: 157 MMHG | HEART RATE: 52 BPM | DIASTOLIC BLOOD PRESSURE: 69 MMHG

## 2024-10-07 VITALS — HEART RATE: 50 BPM | SYSTOLIC BLOOD PRESSURE: 130 MMHG | DIASTOLIC BLOOD PRESSURE: 67 MMHG

## 2024-10-07 VITALS — DIASTOLIC BLOOD PRESSURE: 66 MMHG | TEMPERATURE: 97.7 F | SYSTOLIC BLOOD PRESSURE: 129 MMHG | HEART RATE: 58 BPM

## 2024-10-07 VITALS — DIASTOLIC BLOOD PRESSURE: 62 MMHG | HEART RATE: 57 BPM | SYSTOLIC BLOOD PRESSURE: 151 MMHG | TEMPERATURE: 98.1 F

## 2024-10-07 VITALS — SYSTOLIC BLOOD PRESSURE: 157 MMHG

## 2024-10-07 VITALS — DIASTOLIC BLOOD PRESSURE: 60 MMHG | HEART RATE: 60 BPM | SYSTOLIC BLOOD PRESSURE: 150 MMHG

## 2024-10-07 VITALS — DIASTOLIC BLOOD PRESSURE: 64 MMHG | TEMPERATURE: 98.2 F | SYSTOLIC BLOOD PRESSURE: 132 MMHG | HEART RATE: 54 BPM

## 2024-10-07 VITALS — TEMPERATURE: 98.5 F | HEART RATE: 60 BPM | SYSTOLIC BLOOD PRESSURE: 123 MMHG | DIASTOLIC BLOOD PRESSURE: 67 MMHG

## 2024-10-07 VITALS — HEART RATE: 52 BPM | SYSTOLIC BLOOD PRESSURE: 129 MMHG | TEMPERATURE: 98.2 F | DIASTOLIC BLOOD PRESSURE: 69 MMHG

## 2024-10-07 VITALS — HEART RATE: 50 BPM | SYSTOLIC BLOOD PRESSURE: 131 MMHG | DIASTOLIC BLOOD PRESSURE: 57 MMHG

## 2024-10-07 VITALS — HEART RATE: 54 BPM | SYSTOLIC BLOOD PRESSURE: 154 MMHG | DIASTOLIC BLOOD PRESSURE: 76 MMHG

## 2024-10-07 VITALS — SYSTOLIC BLOOD PRESSURE: 138 MMHG | HEART RATE: 54 BPM | DIASTOLIC BLOOD PRESSURE: 53 MMHG | TEMPERATURE: 98.1 F

## 2024-10-07 VITALS — DIASTOLIC BLOOD PRESSURE: 76 MMHG | SYSTOLIC BLOOD PRESSURE: 154 MMHG | HEART RATE: 54 BPM

## 2024-10-07 VITALS — SYSTOLIC BLOOD PRESSURE: 130 MMHG

## 2024-10-07 VITALS — SYSTOLIC BLOOD PRESSURE: 138 MMHG

## 2024-10-07 VITALS — SYSTOLIC BLOOD PRESSURE: 151 MMHG

## 2024-10-07 VITALS — DIASTOLIC BLOOD PRESSURE: 57 MMHG | TEMPERATURE: 98.2 F | HEART RATE: 50 BPM | SYSTOLIC BLOOD PRESSURE: 131 MMHG

## 2024-10-07 VITALS — SYSTOLIC BLOOD PRESSURE: 129 MMHG

## 2024-10-07 VITALS — SYSTOLIC BLOOD PRESSURE: 123 MMHG

## 2024-10-07 LAB
ALBUMIN SERPL-MCNC: 3.6 G/DL (ref 3.4–4.8)
ALP SERPL-CCNC: 77 U/L (ref 40–150)
ALT SERPL-CCNC: 9 U/L (ref 0–55)
ANION GAP SERPL CALC-SCNC: 11 MMOL/L (ref 7–16)
AST SERPL-CCNC: 17 U/L (ref 5–34)
BASOPHILS # BLD: 0 K/MM3 (ref 0–0.2)
BASOPHILS NFR BLD AUTO: 0.3 % (ref 0–2)
BILIRUB SERPL-MCNC: 0.3 MG/DL (ref 0.2–1.2)
BUN SERPL-MCNC: 59 MG/DL (ref 10–20)
CALCIUM SERPL-MCNC: 9.1 MG/DL (ref 8.4–10.2)
CHLORIDE SERPL-SCNC: 98 MEQ/L (ref 98–107)
CREAT SERPL-SCNC: 2.27 MG/DL (ref 0.57–1.11)
EOSINOPHIL # BLD: 0.1 K/MM3 (ref 0–0.7)
EOSINOPHIL NFR BLD: 1.1 % (ref 0–4)
ERYTHROCYTE [DISTWIDTH] IN BLOOD BY AUTOMATED COUNT: 14.7 % (ref 11.5–14.5)
GLUCOSE SERPL-MCNC: 167 MG/DL (ref 70–99)
GRANULOCYTES # BLD AUTO: 75.9 % (ref 42.2–75.2)
HCT VFR BLD AUTO: 25.5 % (ref 37–47)
HCT VFR BLD AUTO: 30.3 % (ref 37–47)
HGB BLD-MCNC: 8.2 G/DL (ref 12.5–16)
HGB BLD-MCNC: 9.8 G/DL (ref 12.5–16)
LYMPHOCYTES # BLD: 0.7 K/MM3 (ref 1.2–3.4)
LYMPHOCYTES NFR BLD: 9 % (ref 20–51)
MCH RBC QN AUTO: 29 PG (ref 27–31)
MCHC RBC AUTO-ENTMCNC: 32 G/DL (ref 33–37)
MCV RBC AUTO: 91 FL (ref 80–100)
MONOCYTES # BLD: 1 K/MM3 (ref 0.1–0.6)
MONOCYTES NFR BLD AUTO: 13.3 % (ref 1.7–9.3)
NEUTROPHILS # BLD: 5.8 K/MM3 (ref 1.4–6.5)
PLATELET # BLD AUTO: 148 K/MM3 (ref 130–400)
PMV BLD AUTO: 11.8 FL (ref 7.4–10.4)
POTASSIUM SERPL-SCNC: 4 MEQ/L (ref 3.5–4.5)
PROT SERPL-MCNC: 6.1 G/DL (ref 6.2–8.1)
RBC # BLD AUTO: 2.8 M/MM3 (ref 4.1–5.3)
SODIUM SERPL-SCNC: 141 MEQ/L (ref 136–145)

## 2024-10-07 NOTE — NUR
UPON SHIFT ASSESSMENT SUGEY WAS AWKAE IN BED A&O X 4 WITH FAMILY VISITING. SHE
DENIES SOA OR CHESTPAIN AND IS PLEASANT AND EAGERLY AWAITING DISCHARGE. 2 HR
H&H POST PRBC INFUSION RESULTED:9.2 Hgb. +2 EDEMA NOTED IN BLLE AND LUNG
AUSCULTATED FINE CRACKLES THAT CLEARED WITH COUGH. PATIENT DENIES PAIN OR
NEEDS AT THIS TIME. CALL LIGHT WITHIN REACH.

## 2024-10-07 NOTE — NUR
met with patient to check in and discuss discharge planning.
Patient still plans to return home at time of discharge and does not feel Home
Health is necessary at this time. Patient stated she has two nieces that live
nearby that can assist her if needed.
 
Discharge Plan: Home

## 2024-10-07 NOTE — NUR
PATIENT WENT TO BATHROOM AND SPOKE TO FAMILY MEMBER. MENTIONED THAT HER
BUTTOCKS FEELS SORE, BUT SHE DOESN'T KNOW IF THE SKIN IS OPEN OR NOT. THIS RN
ASSESSED SORE AREA. BUTTOCKS IS RED, BUT BLANCHABLE. IN BETWEEN BUTTOCKS,
THERE APPEARS TO BE A BROKEN BLISTER OR SCAB FROM PREVIOUS PRESSURE INJURY.
THIS RN APPLIED BARRIER CREAM AND MIPLEX DRSG TO AREA TO PREVENT FURTHER
INJURY. THIS RN PROVIDED PATIENT EDUCATION ON THE IMPORTANCE OF REPOSITIONING.
PATIENT UNDERSTANDING, AND STATES THIS HAPPENED BEFORE.

## 2024-10-08 VITALS — TEMPERATURE: 98.1 F | SYSTOLIC BLOOD PRESSURE: 148 MMHG | DIASTOLIC BLOOD PRESSURE: 61 MMHG | HEART RATE: 54 BPM

## 2024-10-08 VITALS — TEMPERATURE: 98.4 F | HEART RATE: 58 BPM | SYSTOLIC BLOOD PRESSURE: 152 MMHG | DIASTOLIC BLOOD PRESSURE: 73 MMHG

## 2024-10-08 VITALS — TEMPERATURE: 98.4 F | DIASTOLIC BLOOD PRESSURE: 59 MMHG | HEART RATE: 58 BPM | SYSTOLIC BLOOD PRESSURE: 153 MMHG

## 2024-10-08 VITALS — DIASTOLIC BLOOD PRESSURE: 69 MMHG | TEMPERATURE: 98 F | SYSTOLIC BLOOD PRESSURE: 147 MMHG | HEART RATE: 58 BPM

## 2024-10-08 VITALS — HEART RATE: 54 BPM | DIASTOLIC BLOOD PRESSURE: 86 MMHG | SYSTOLIC BLOOD PRESSURE: 147 MMHG | TEMPERATURE: 97.7 F

## 2024-10-08 VITALS — DIASTOLIC BLOOD PRESSURE: 54 MMHG | TEMPERATURE: 98.3 F | SYSTOLIC BLOOD PRESSURE: 146 MMHG | HEART RATE: 54 BPM

## 2024-10-08 VITALS — SYSTOLIC BLOOD PRESSURE: 126 MMHG

## 2024-10-08 VITALS — SYSTOLIC BLOOD PRESSURE: 147 MMHG

## 2024-10-08 VITALS — SYSTOLIC BLOOD PRESSURE: 152 MMHG

## 2024-10-08 VITALS — SYSTOLIC BLOOD PRESSURE: 126 MMHG | TEMPERATURE: 98.1 F | DIASTOLIC BLOOD PRESSURE: 73 MMHG | HEART RATE: 64 BPM

## 2024-10-08 VITALS — SYSTOLIC BLOOD PRESSURE: 153 MMHG

## 2024-10-08 LAB
ALBUMIN SERPL-MCNC: 3.5 G/DL (ref 3.4–4.8)
ALP SERPL-CCNC: 78 U/L (ref 40–150)
ALT SERPL-CCNC: 11 U/L (ref 0–55)
ANION GAP SERPL CALC-SCNC: 12 MMOL/L (ref 7–16)
AST SERPL-CCNC: 16 U/L (ref 5–34)
BASOPHILS # BLD: 0 K/MM3 (ref 0–0.2)
BASOPHILS NFR BLD AUTO: 0.3 % (ref 0–2)
BILIRUB SERPL-MCNC: 0.4 MG/DL (ref 0.2–1.2)
BUN SERPL-MCNC: 68 MG/DL (ref 10–20)
CALCIUM SERPL-MCNC: 9.4 MG/DL (ref 8.4–10.2)
CHLORIDE SERPL-SCNC: 98 MEQ/L (ref 98–107)
CREAT SERPL-SCNC: 2.26 MG/DL (ref 0.57–1.11)
EOSINOPHIL # BLD: 0.1 K/MM3 (ref 0–0.7)
EOSINOPHIL NFR BLD: 1 % (ref 0–4)
ERYTHROCYTE [DISTWIDTH] IN BLOOD BY AUTOMATED COUNT: 15.8 % (ref 11.5–14.5)
GLUCOSE SERPL-MCNC: 136 MG/DL (ref 70–99)
GRANULOCYTES # BLD AUTO: 75.1 % (ref 42.2–75.2)
HCT VFR BLD AUTO: 28.4 % (ref 37–47)
HGB BLD-MCNC: 9.3 G/DL (ref 12.5–16)
LYMPHOCYTES # BLD: 0.7 K/MM3 (ref 1.2–3.4)
LYMPHOCYTES NFR BLD: 9.7 % (ref 20–51)
MCH RBC QN AUTO: 29 PG (ref 27–31)
MCHC RBC AUTO-ENTMCNC: 33 G/DL (ref 33–37)
MCV RBC AUTO: 90 FL (ref 80–100)
MONOCYTES # BLD: 1 K/MM3 (ref 0.1–0.6)
MONOCYTES NFR BLD AUTO: 13.6 % (ref 1.7–9.3)
NEUTROPHILS # BLD: 5.4 K/MM3 (ref 1.4–6.5)
PLATELET # BLD AUTO: 138 K/MM3 (ref 130–400)
PMV BLD AUTO: 11.9 FL (ref 7.4–10.4)
POTASSIUM SERPL-SCNC: 3.8 MEQ/L (ref 3.5–4.5)
PROT SERPL-MCNC: 6.1 G/DL (ref 6.2–8.1)
RBC # BLD AUTO: 3.16 M/MM3 (ref 4.1–5.3)
SODIUM SERPL-SCNC: 140 MEQ/L (ref 136–145)

## 2024-10-08 NOTE — NUR
patient lying in bed alert and oriented x4. denies chest pain and shortness of
breath. +2 pitting edema in BLE, SCD applie, pt refused ace wrap and karel hose
at this time. small scattered scabs noted to bilateral shins, small right
closed blister on buttock. fall precautions in place, call light within reach.
pt has no further needs, questions orconcerns at this time

## 2024-10-08 NOTE — NUR
attended clinical rounds with the team. Patient is not ready for
discharge at this time. Hospitalist discussed placing a consult for cardiac
rehab.

## 2024-10-08 NOTE — NUR
AMBULATING AND VOIDING NO ASSIST, I&0 WITH INTAKE 880, OUTPUT 300
PT HAS NO COMPLAINTS OF PAIN WITH BILATERAL PITING
EDEMA IN LOWER EXTREMITIES. LOWER EXTREMITIES ARE RED, TIGHT, AND SHINY WITH
FLAKY AND SCALING SKIN. RIGHT LEG MORE INFLAMMED AND EDEMATOUS
OPEN PRESSURE ULCER COVERED WITH FOAM DRESSING ON
GLUTEAL FOLD AND COCCYX VEGA SANTOS

## 2024-10-09 VITALS — SYSTOLIC BLOOD PRESSURE: 134 MMHG

## 2024-10-09 VITALS — DIASTOLIC BLOOD PRESSURE: 65 MMHG | HEART RATE: 53 BPM | TEMPERATURE: 98.2 F | SYSTOLIC BLOOD PRESSURE: 130 MMHG

## 2024-10-09 VITALS — DIASTOLIC BLOOD PRESSURE: 57 MMHG | TEMPERATURE: 97.7 F | SYSTOLIC BLOOD PRESSURE: 134 MMHG | HEART RATE: 54 BPM

## 2024-10-09 VITALS — HEART RATE: 54 BPM | SYSTOLIC BLOOD PRESSURE: 152 MMHG | DIASTOLIC BLOOD PRESSURE: 70 MMHG | TEMPERATURE: 98 F

## 2024-10-09 VITALS — SYSTOLIC BLOOD PRESSURE: 135 MMHG | TEMPERATURE: 98.2 F | HEART RATE: 54 BPM | DIASTOLIC BLOOD PRESSURE: 68 MMHG

## 2024-10-09 VITALS — SYSTOLIC BLOOD PRESSURE: 131 MMHG | DIASTOLIC BLOOD PRESSURE: 53 MMHG | HEART RATE: 53 BPM | TEMPERATURE: 98.1 F

## 2024-10-09 VITALS — SYSTOLIC BLOOD PRESSURE: 129 MMHG

## 2024-10-09 VITALS — HEART RATE: 51 BPM | SYSTOLIC BLOOD PRESSURE: 129 MMHG | TEMPERATURE: 97.9 F | DIASTOLIC BLOOD PRESSURE: 54 MMHG

## 2024-10-09 VITALS — SYSTOLIC BLOOD PRESSURE: 152 MMHG

## 2024-10-09 VITALS — SYSTOLIC BLOOD PRESSURE: 130 MMHG

## 2024-10-09 VITALS — SYSTOLIC BLOOD PRESSURE: 135 MMHG

## 2024-10-09 VITALS — SYSTOLIC BLOOD PRESSURE: 146 MMHG

## 2024-10-09 VITALS — SYSTOLIC BLOOD PRESSURE: 131 MMHG

## 2024-10-09 LAB
ALBUMIN SERPL-MCNC: 3.4 G/DL (ref 3.4–4.8)
ALP SERPL-CCNC: 81 U/L (ref 40–150)
ALT SERPL-CCNC: 6 U/L (ref 0–55)
ANION GAP SERPL CALC-SCNC: 11 MMOL/L (ref 7–16)
AST SERPL-CCNC: 15 U/L (ref 5–34)
BASOPHILS # BLD: 0 K/MM3 (ref 0–0.2)
BASOPHILS NFR BLD AUTO: 0.3 % (ref 0–2)
BILIRUB SERPL-MCNC: 0.5 MG/DL (ref 0.2–1.2)
BUN SERPL-MCNC: 73 MG/DL (ref 10–20)
CALCIUM SERPL-MCNC: 9.2 MG/DL (ref 8.4–10.2)
CHLORIDE SERPL-SCNC: 96 MEQ/L (ref 98–107)
CREAT SERPL-SCNC: 2.12 MG/DL (ref 0.57–1.11)
EOSINOPHIL # BLD: 0.1 K/MM3 (ref 0–0.7)
EOSINOPHIL NFR BLD: 1.8 % (ref 0–4)
ERYTHROCYTE [DISTWIDTH] IN BLOOD BY AUTOMATED COUNT: 15.3 % (ref 11.5–14.5)
GLUCOSE SERPL-MCNC: 128 MG/DL (ref 70–99)
GRANULOCYTES # BLD AUTO: 73.7 % (ref 42.2–75.2)
HCT VFR BLD AUTO: 29.4 % (ref 37–47)
HGB BLD-MCNC: 9.5 G/DL (ref 12.5–16)
LYMPHOCYTES # BLD: 0.7 K/MM3 (ref 1.2–3.4)
LYMPHOCYTES NFR BLD: 9.6 % (ref 20–51)
MCH RBC QN AUTO: 29 PG (ref 27–31)
MCHC RBC AUTO-ENTMCNC: 32 G/DL (ref 33–37)
MCV RBC AUTO: 90 FL (ref 80–100)
MONOCYTES # BLD: 1 K/MM3 (ref 0.1–0.6)
MONOCYTES NFR BLD AUTO: 14.3 % (ref 1.7–9.3)
NEUTROPHILS # BLD: 5 K/MM3 (ref 1.4–6.5)
PLATELET # BLD AUTO: 149 K/MM3 (ref 130–400)
PMV BLD AUTO: 11.7 FL (ref 7.4–10.4)
POTASSIUM SERPL-SCNC: 4.2 MEQ/L (ref 3.5–4.5)
PROT SERPL-MCNC: 6.1 G/DL (ref 6.2–8.1)
RBC # BLD AUTO: 3.28 M/MM3 (ref 4.1–5.3)
SODIUM SERPL-SCNC: 139 MEQ/L (ref 136–145)

## 2024-10-09 NOTE — NUR
patient sitting up to side of bed, alert and oriented x4. denies chest pain
and shortness of breath. +1 pitting edema in BLE with ACE wraps applied.
ambulates with SBA and steady gait. small closed scab on right buttock and
small closed scattered scabs on bilateral shins. fall precautions in place,
call light within reach. pt has no further needs, questions or concerns at
this time.

## 2024-10-10 VITALS — SYSTOLIC BLOOD PRESSURE: 147 MMHG

## 2024-10-10 VITALS — TEMPERATURE: 98.1 F | HEART RATE: 56 BPM | SYSTOLIC BLOOD PRESSURE: 153 MMHG | DIASTOLIC BLOOD PRESSURE: 62 MMHG

## 2024-10-10 VITALS — HEART RATE: 54 BPM | DIASTOLIC BLOOD PRESSURE: 69 MMHG | TEMPERATURE: 98 F | SYSTOLIC BLOOD PRESSURE: 144 MMHG

## 2024-10-10 VITALS — TEMPERATURE: 98.3 F | DIASTOLIC BLOOD PRESSURE: 59 MMHG | SYSTOLIC BLOOD PRESSURE: 147 MMHG | HEART RATE: 56 BPM

## 2024-10-10 VITALS — SYSTOLIC BLOOD PRESSURE: 144 MMHG

## 2024-10-10 VITALS — SYSTOLIC BLOOD PRESSURE: 153 MMHG

## 2024-10-10 LAB
ALBUMIN SERPL-MCNC: 3.3 G/DL (ref 3.4–4.8)
ALP SERPL-CCNC: 78 U/L (ref 40–150)
ALT SERPL-CCNC: < 6 U/L (ref 0–55)
ANION GAP SERPL CALC-SCNC: 12 MMOL/L (ref 7–16)
AST SERPL-CCNC: 13 U/L (ref 5–34)
BASOPHILS # BLD: 0 K/MM3 (ref 0–0.2)
BASOPHILS NFR BLD AUTO: 0.3 % (ref 0–2)
BILIRUB SERPL-MCNC: 0.4 MG/DL (ref 0.2–1.2)
BUN SERPL-MCNC: 81 MG/DL (ref 10–20)
CALCIUM SERPL-MCNC: 9.1 MG/DL (ref 8.4–10.2)
CHLORIDE SERPL-SCNC: 97 MEQ/L (ref 98–107)
CREAT SERPL-SCNC: 2.35 MG/DL (ref 0.57–1.11)
EOSINOPHIL # BLD: 0.1 K/MM3 (ref 0–0.7)
EOSINOPHIL NFR BLD: 1 % (ref 0–4)
ERYTHROCYTE [DISTWIDTH] IN BLOOD BY AUTOMATED COUNT: 15.2 % (ref 11.5–14.5)
GLUCOSE SERPL-MCNC: 146 MG/DL (ref 70–99)
GRANULOCYTES # BLD AUTO: 74.3 % (ref 42.2–75.2)
HCT VFR BLD AUTO: 27.8 % (ref 37–47)
HGB BLD-MCNC: 9.1 G/DL (ref 12.5–16)
LYMPHOCYTES # BLD: 0.8 K/MM3 (ref 1.2–3.4)
LYMPHOCYTES NFR BLD: 11.2 % (ref 20–51)
MCH RBC QN AUTO: 29 PG (ref 27–31)
MCHC RBC AUTO-ENTMCNC: 33 G/DL (ref 33–37)
MCV RBC AUTO: 89 FL (ref 80–100)
MONOCYTES # BLD: 0.9 K/MM3 (ref 0.1–0.6)
MONOCYTES NFR BLD AUTO: 12.9 % (ref 1.7–9.3)
NEUTROPHILS # BLD: 5 K/MM3 (ref 1.4–6.5)
PLATELET # BLD AUTO: 149 K/MM3 (ref 130–400)
PMV BLD AUTO: 11.7 FL (ref 7.4–10.4)
POTASSIUM SERPL-SCNC: 4 MEQ/L (ref 3.5–4.5)
PROT SERPL-MCNC: 5.9 G/DL (ref 6.2–8.1)
RBC # BLD AUTO: 3.13 M/MM3 (ref 4.1–5.3)
SODIUM SERPL-SCNC: 140 MEQ/L (ref 136–145)

## 2024-10-10 NOTE — NUR
attended clinical rounds with the team and patient is ready for
discharge. SW met with patient who plans to stay with her son, Munir who lives
in [place]. Since she is staying with her son, she does not feel home health
is needed at this time. SW reviewed IM form with patient who verbalized
understanding and provided signature. SW placed form in chart and provided
copy to patient.
 
Discharge Plan: Home with family

## 2024-10-10 NOTE — NUR
THIS RN PROVIDED PATIENT WITH DISCHARGE EDUCATION AND INSTRUCTIONS. PATIENT
ESCORTED OFF UNIT VIA WHEELCHAIR WITH PCT AND SON. ALL BELONGINGS WITH
PATIENT.

## 2024-10-24 ENCOUNTER — HOSPITAL ENCOUNTER (INPATIENT)
Dept: HOSPITAL 19 - COL.ER | Age: 74
LOS: 6 days | Discharge: NURSING, REHAB OR LONG-TERM CARE | DRG: 208 | End: 2024-10-30
Attending: INTERNAL MEDICINE | Admitting: INTERNAL MEDICINE
Payer: MEDICARE

## 2024-10-24 VITALS — DIASTOLIC BLOOD PRESSURE: 74 MMHG | HEART RATE: 59 BPM | SYSTOLIC BLOOD PRESSURE: 120 MMHG

## 2024-10-24 VITALS — BODY MASS INDEX: 44.06 KG/M2 | HEIGHT: 60 IN | WEIGHT: 224.43 LBS

## 2024-10-24 VITALS — OXYGEN SATURATION: 100 %

## 2024-10-24 VITALS — TEMPERATURE: 97.3 F | SYSTOLIC BLOOD PRESSURE: 120 MMHG | DIASTOLIC BLOOD PRESSURE: 51 MMHG | HEART RATE: 57 BPM

## 2024-10-24 DIAGNOSIS — E87.3: ICD-10-CM

## 2024-10-24 DIAGNOSIS — N18.9: ICD-10-CM

## 2024-10-24 DIAGNOSIS — J96.02: Primary | ICD-10-CM

## 2024-10-24 DIAGNOSIS — E11.22: ICD-10-CM

## 2024-10-24 DIAGNOSIS — D50.0: ICD-10-CM

## 2024-10-24 DIAGNOSIS — J44.9: ICD-10-CM

## 2024-10-24 DIAGNOSIS — E78.5: ICD-10-CM

## 2024-10-24 DIAGNOSIS — Q60.0: ICD-10-CM

## 2024-10-24 DIAGNOSIS — T88.4XXD: ICD-10-CM

## 2024-10-24 DIAGNOSIS — Z79.899: ICD-10-CM

## 2024-10-24 DIAGNOSIS — D63.8: ICD-10-CM

## 2024-10-24 DIAGNOSIS — J96.21: ICD-10-CM

## 2024-10-24 DIAGNOSIS — I47.20: ICD-10-CM

## 2024-10-24 DIAGNOSIS — I50.9: ICD-10-CM

## 2024-10-24 DIAGNOSIS — Z87.2: ICD-10-CM

## 2024-10-24 DIAGNOSIS — I25.10: ICD-10-CM

## 2024-10-24 DIAGNOSIS — J69.0: ICD-10-CM

## 2024-10-24 DIAGNOSIS — N17.9: ICD-10-CM

## 2024-10-24 DIAGNOSIS — I27.20: ICD-10-CM

## 2024-10-24 DIAGNOSIS — E87.20: ICD-10-CM

## 2024-10-24 DIAGNOSIS — I50.30: ICD-10-CM

## 2024-10-24 DIAGNOSIS — I11.0: ICD-10-CM

## 2024-10-24 DIAGNOSIS — I13.0: ICD-10-CM

## 2024-10-24 LAB
ALBUMIN SERPL-MCNC: 3.8 G/DL (ref 3.4–4.8)
ALP SERPL-CCNC: 115 U/L (ref 40–150)
ALT SERPL-CCNC: 18 U/L (ref 0–55)
ANION GAP SERPL CALC-SCNC: 13 MMOL/L (ref 7–16)
AST SERPL-CCNC: 25 U/L (ref 5–34)
BASE EXCESS BLDA CALC-SCNC: 1.6 MMOL/L (ref -2–2)
BASE EXCESS BLDA CALC-SCNC: 4.7 MMOL/L (ref -2–2)
BASOPHILS # BLD: 0 K/MM3 (ref 0–0.2)
BASOPHILS NFR BLD AUTO: 0.3 % (ref 0–2)
BILIRUB SERPL-MCNC: 0.3 MG/DL (ref 0.2–1.2)
BUN SERPL-MCNC: 39 MG/DL (ref 10–20)
CALCIUM SERPL-MCNC: 9.6 MG/DL (ref 8.4–10.2)
CHLORIDE SERPL-SCNC: 98 MEQ/L (ref 98–107)
CO2 BLDA-SCNC: 30.8 MMOL/L
CO2 BLDA-SCNC: 32.8 MMOL/L
CREAT SERPL-SCNC: 1.99 MG/DL (ref 0.57–1.11)
EOSINOPHIL # BLD: 0 K/MM3 (ref 0–0.7)
EOSINOPHIL NFR BLD: 0.1 % (ref 0–4)
ERYTHROCYTE [DISTWIDTH] IN BLOOD BY AUTOMATED COUNT: 14.6 % (ref 11.5–14.5)
GLUCOSE SERPL-MCNC: 305 MG/DL (ref 70–99)
GRANULOCYTES # BLD AUTO: 82.1 % (ref 42.2–75.2)
HCO3 BLDA-SCNC: 29.4 MEQ/L (ref 22–26)
HCO3 BLDA-SCNC: 30.6 MEQ/L (ref 22–26)
HCT VFR BLD AUTO: 38.8 % (ref 37–47)
HGB BLD-MCNC: 11.9 G/DL (ref 12.5–16)
INHALED O2 CONCENTRATION: 60 %
INR BLD: 1.1 (ref 0.8–3)
LYMPHOCYTES # BLD: 1.5 K/MM3 (ref 1.2–3.4)
LYMPHOCYTES NFR BLD: 10.5 % (ref 20–51)
MCH RBC QN AUTO: 29 PG (ref 27–31)
MCHC RBC AUTO-ENTMCNC: 31 G/DL (ref 33–37)
MCV RBC AUTO: 95 FL (ref 80–100)
MONOCYTES # BLD: 0.9 K/MM3 (ref 0.1–0.6)
MONOCYTES NFR BLD AUTO: 6.2 % (ref 1.7–9.3)
NEUTROPHILS # BLD: 11.9 K/MM3 (ref 1.4–6.5)
PCO2 BLDA: 44.1 MMHG (ref 35–45)
PCO2 BLDA: 73.2 MMHG (ref 35–45)
PLATELET # BLD AUTO: 203 K/MM3 (ref 130–400)
PMV BLD AUTO: 12.4 FL (ref 7.4–10.4)
PO2 BLDA: 102.6 MMHG (ref 80–100)
PO2 BLDA: 86.9 MMHG (ref 80–100)
POTASSIUM SERPL-SCNC: 3.9 MEQ/L (ref 3.5–4.5)
PROT SERPL-MCNC: 7.5 G/DL (ref 6.2–8.1)
PROTHROMBIN TIME: 12 SECONDS (ref 9.7–12.8)
RBC # BLD AUTO: 4.08 M/MM3 (ref 4.1–5.3)
SAO2 % BLDA: 96.2 % (ref 92–100)
SAO2 % BLDA: 98.3 % (ref 92–100)
SODIUM SERPL-SCNC: 142 MEQ/L (ref 136–145)
URN COLLECT METHOD CLASS: (no result)

## 2024-10-24 PROCEDURE — C1751 CATH, INF, PER/CENT/MIDLINE: HCPCS

## 2024-10-24 PROCEDURE — 5A1945Z RESPIRATORY VENTILATION, 24-96 CONSECUTIVE HOURS: ICD-10-PCS | Performed by: INTERNAL MEDICINE

## 2024-10-24 PROCEDURE — A4314 CATH W/DRAINAGE 2-WAY LATEX: HCPCS

## 2024-10-24 NOTE — NUR
PT ARRIVED ER VIA EMS WAS BAGGED UNTIL
INTUBATED @ 1716 PER DR DE JESUS WITH 7.5ETT 22 TEETH GOOD ETCO2
COLOR CHANGE AND BILATERAL BREATH SOUNDS. CXR DONE ADVANCED ETT TO 24 UPPER
LIP. PLACED ON VENTILATOR PER CHARTED SETTINGS.
TRANSPORTED TO CT
APPROXIMATELY 1800 RETURNED 1815 PLACED BACK ON VENTILATOR DECREASED VT 
BECAUSE OF INCREASED PEAK PRESSURES. BREATH SOUNDS WHEEZING THROUGHOUT. ABG
OBTAINED PH LOW CO2 HIGH INCREASED RR TO 24 OBTAINED ANOTHER ABG 2050 WITH
NORMAL RANGES. TRANSPORTED TO ICU ROOM 7 VIA VENT @ AROUND 2100.

## 2024-10-24 NOTE — NUR
PT ON FLOOR FROM ED VIA BED AROUND 2100, RT AT BEDSIDE. PT ON VENT AND
TOLERATING AT THIS TIME. FENTANYL AND VERSED RUNNING UPON ARRIVAL, VERSED
STOPPED AND CHANGED TO PROPOFOL PER HOSPITALIST. PT RESPONDING TO PAINFUL
STIMULI AND UNABLE TO FOLLOW COMMANDS AT THIS TIME, NO FAMILY AT BEDSIDE. OG
TO LIS, GREEN OUTPUT NOTED. HUDDLESTON PATENT. INTS TO RIGHT HAND AND LEFT AC.
SCATTERED BLISTERS TO BLE, SCANT YELLOW DRAINAGE NOTED. SACRUM EXORTIATED AND
REDNESS NOTED. REDNESS NOTED UNDER BREASTS. INITAL RECTAL TEMP 97.3, UNABLE TO
GET NEXT ACCURATE TEMP AND RECTAL THERMOMETER PLACED. DRIED BLOOD NOTED TO
LEFT NARE, NO ACTIVE BLEED NOTED AND HOSPITALIST NOTIFIED. PTS GOLD HOOP
EARRINGS REMOVED AND PLACED IN CUP WITH PT STICKER. PT HAS NO OBVIOUS NEEDS AT
THIS TIME. BED IN LOWEST POSITION, BED ALARM ON

## 2024-10-24 NOTE — NUR
PT NOT ON FLOOR AT THIS TIME. THIS NURSE UPDATED HOSPITALIST OF CHARTED BG >
300. VERBAL GIVEN FOR Q4H ACCUCHECKS WITH MID SLIDING SCALE AND DISCONTINUE
VERSED AND INTIATE PROPOFOL FOR SEDATION. THE NURSE REPEATED BACK AND
VERBALIZED UNDERSTANDING

## 2024-10-25 VITALS — OXYGEN SATURATION: 100 %

## 2024-10-25 VITALS — OXYGEN SATURATION: 98 %

## 2024-10-25 VITALS — OXYGEN SATURATION: 99 %

## 2024-10-25 VITALS
SYSTOLIC BLOOD PRESSURE: 93 MMHG | HEART RATE: 50 BPM | DIASTOLIC BLOOD PRESSURE: 59 MMHG | OXYGEN SATURATION: 100 % | TEMPERATURE: 98.4 F

## 2024-10-25 VITALS
SYSTOLIC BLOOD PRESSURE: 130 MMHG | HEART RATE: 54 BPM | DIASTOLIC BLOOD PRESSURE: 48 MMHG | TEMPERATURE: 97.9 F | OXYGEN SATURATION: 98 %

## 2024-10-25 VITALS — TEMPERATURE: 98.6 F | SYSTOLIC BLOOD PRESSURE: 105 MMHG | DIASTOLIC BLOOD PRESSURE: 87 MMHG | HEART RATE: 59 BPM

## 2024-10-25 VITALS — SYSTOLIC BLOOD PRESSURE: 138 MMHG | HEART RATE: 61 BPM | DIASTOLIC BLOOD PRESSURE: 56 MMHG | TEMPERATURE: 95.4 F

## 2024-10-25 VITALS — OXYGEN SATURATION: 97 %

## 2024-10-25 VITALS — HEART RATE: 66 BPM | SYSTOLIC BLOOD PRESSURE: 141 MMHG | TEMPERATURE: 98.4 F | DIASTOLIC BLOOD PRESSURE: 63 MMHG

## 2024-10-25 VITALS — SYSTOLIC BLOOD PRESSURE: 132 MMHG | OXYGEN SATURATION: 98 % | DIASTOLIC BLOOD PRESSURE: 48 MMHG | HEART RATE: 51 BPM

## 2024-10-25 VITALS — HEART RATE: 52 BPM | DIASTOLIC BLOOD PRESSURE: 89 MMHG | TEMPERATURE: 98.6 F | SYSTOLIC BLOOD PRESSURE: 142 MMHG

## 2024-10-25 VITALS — OXYGEN SATURATION: 88 %

## 2024-10-25 VITALS — OXYGEN SATURATION: 94 %

## 2024-10-25 VITALS — OXYGEN SATURATION: 91 %

## 2024-10-25 VITALS — OXYGEN SATURATION: 100 % | SYSTOLIC BLOOD PRESSURE: 141 MMHG

## 2024-10-25 VITALS — OXYGEN SATURATION: 95 %

## 2024-10-25 VITALS — OXYGEN SATURATION: 90 %

## 2024-10-25 VITALS — SYSTOLIC BLOOD PRESSURE: 138 MMHG

## 2024-10-25 LAB
ANION GAP SERPL CALC-SCNC: 10 MMOL/L (ref 7–16)
APPEARANCE UR: CLEAR
BASE EXCESS BLDA CALC-SCNC: 5.8 MMOL/L (ref -2–2)
BASE EXCESS BLDA CALC-SCNC: 8.8 MMOL/L (ref -2–2)
BASE EXCESS BLDA CALC-SCNC: 9.4 MMOL/L (ref -2–2)
BASOPHILS # BLD: 0 K/MM3 (ref 0–0.2)
BASOPHILS NFR BLD AUTO: 0.1 % (ref 0–2)
BUN SERPL-MCNC: 40 MG/DL (ref 10–20)
CALCIUM SERPL-MCNC: 8.5 MG/DL (ref 8.4–10.2)
CHLORIDE SERPL-SCNC: 102 MEQ/L (ref 98–107)
CO2 BLDA-SCNC: 29 MMOL/L
CO2 BLDA-SCNC: 30.6 MMOL/L
CO2 BLDA-SCNC: 34.6 MMOL/L
COLOR UR AUTO: YELLOW
CREAT SERPL-SCNC: 1.89 MG/DL (ref 0.57–1.11)
EOSINOPHIL # BLD: 0 K/MM3 (ref 0–0.7)
EOSINOPHIL NFR BLD: 0 % (ref 0–4)
ERYTHROCYTE [DISTWIDTH] IN BLOOD BY AUTOMATED COUNT: 14.2 % (ref 11.5–14.5)
GLUCOSE SERPL-MCNC: 191 MG/DL (ref 70–99)
GLUCOSE UR QL STRIP.AUTO: (no result)
GRANULOCYTES # BLD AUTO: 78.4 % (ref 42.2–75.2)
HCO3 BLDA-SCNC: 28 MEQ/L (ref 22–26)
HCO3 BLDA-SCNC: 29.8 MEQ/L (ref 22–26)
HCO3 BLDA-SCNC: 33.3 MEQ/L (ref 22–26)
HCT VFR BLD AUTO: 28.9 % (ref 37–47)
HGB BLD-MCNC: 9.2 G/DL (ref 12.5–16)
INHALED O2 CONCENTRATION: 40 %
KETONES UR STRIP.AUTO-MCNC: NEGATIVE MG/DL
LYMPHOCYTES # BLD: 0.8 K/MM3 (ref 1.2–3.4)
LYMPHOCYTES NFR BLD: 11.1 % (ref 20–51)
MCH RBC QN AUTO: 29 PG (ref 27–31)
MCHC RBC AUTO-ENTMCNC: 32 G/DL (ref 33–37)
MCV RBC AUTO: 92 FL (ref 80–100)
MONOCYTES # BLD: 0.7 K/MM3 (ref 0.1–0.6)
MONOCYTES NFR BLD AUTO: 10.1 % (ref 1.7–9.3)
NEUTROPHILS # BLD: 5.5 K/MM3 (ref 1.4–6.5)
NITRATE UR-MCNC: NEGATIVE UG/ML
PCO2 BLDA: 28.3 MMHG (ref 35–45)
PCO2 BLDA: 31.9 MMHG (ref 35–45)
PCO2 BLDA: 43 MMHG (ref 35–45)
PH UR STRIP.AUTO: 5.5 [PH] (ref 5–8.5)
PLATELET # BLD AUTO: 142 K/MM3 (ref 130–400)
PMV BLD AUTO: 11.8 FL (ref 7.4–10.4)
PO2 BLDA: 81 MMHG (ref 80–100)
PO2 BLDA: 88.2 MMHG (ref 80–100)
PO2 BLDA: 92.1 MMHG (ref 80–100)
POTASSIUM SERPL-SCNC: 3.7 MEQ/L (ref 3.5–4.5)
RBC # BLD AUTO: 3.16 M/MM3 (ref 4.1–5.3)
RBC # UR STRIP.AUTO: (no result) /UL
SAO2 % BLDA: 97.1 % (ref 92–100)
SAO2 % BLDA: 97.6 % (ref 92–100)
SAO2 % BLDA: 97.6 % (ref 92–100)
SODIUM SERPL-SCNC: 143 MEQ/L (ref 136–145)
SP GR UR STRIP.AUTO: 1.01 (ref 1–1.03)
UA DIPSTICK PNL UR STRIP.AUTO: (no result)
UROBILINOGEN UR STRIP.AUTO-MCNC: 0.2 E.U/DL (ref 0.2–1)

## 2024-10-25 PROCEDURE — 02HV33Z INSERTION OF INFUSION DEVICE INTO SUPERIOR VENA CAVA, PERCUTANEOUS APPROACH: ICD-10-PCS

## 2024-10-25 NOTE — NUR
Patient is laying in bed, propofol and fentanyl infusing, OG tube/ ETT in
place, mckeon draining yellow clear urine. Difficulties with IVs overnight so
LR was discontinued this morning. PICC line was also placed this AM in [facility]. At
approximately 1000 am a weaning trail was performed with medications on
standby. She did well for a few minutes than complained of SOA, and was
working to breath. CPAP trail was transitioned back to assist mode and
sedation was resumed at previous rate. Will continue to follow for improvement
of breathing. At this time she has been bradycardic, therefore, I did hold her
amiodarone. VSS otherwise.

## 2024-10-25 NOTE — NUR
Initial visit; Patient's son stated, when asked that his mom would probably
like a prayer and that she was once a Qu[employer]. Later he mentioned that 
Arslan Martell had ministered to them so I suggested that he let the next 
who visits know this if he would like a visit from a .  offered
prayer for Kamila and a blessing as well.

## 2024-10-25 NOTE — NUR
met with patient's son, Munir (ph#[phone]) and nephew, Enrique
(ph#[phone]) at bedside to complete initial intake. Patient awake and
alert, however is on the vent. Patient occasionally would not her head "yes"
to questions. Patient lives in [facility] alone, however has been staying with
Munir after her recent hospitalization 10/02/24-10/10/24. Patient was
discharged home with Munir and she declined Home Health. Patient's PCP is Dr. Fisher. Patient has DPOA-HC in EMR designating Munir and Enrique. Patient's
family inquired about discharge planning and SW advised they would continue to
follow for recommendations. Family does not feel she should return home. SW
provided Medicare.gov list of SNF options and also discussed [facility] as an
option.
 
Discharge Plan: TBD, on vent

## 2024-10-26 VITALS — OXYGEN SATURATION: 98 %

## 2024-10-26 VITALS — OXYGEN SATURATION: 100 %

## 2024-10-26 VITALS — OXYGEN SATURATION: 99 %

## 2024-10-26 VITALS — OXYGEN SATURATION: 91 %

## 2024-10-26 VITALS — OXYGEN SATURATION: 89 %

## 2024-10-26 VITALS — OXYGEN SATURATION: 95 %

## 2024-10-26 VITALS — OXYGEN SATURATION: 92 %

## 2024-10-26 VITALS — OXYGEN SATURATION: 93 %

## 2024-10-26 VITALS — OXYGEN SATURATION: 88 %

## 2024-10-26 VITALS — DIASTOLIC BLOOD PRESSURE: 55 MMHG | HEART RATE: 54 BPM | SYSTOLIC BLOOD PRESSURE: 145 MMHG | OXYGEN SATURATION: 100 %

## 2024-10-26 VITALS — OXYGEN SATURATION: 94 %

## 2024-10-26 VITALS
DIASTOLIC BLOOD PRESSURE: 74 MMHG | OXYGEN SATURATION: 93 % | TEMPERATURE: 97.7 F | SYSTOLIC BLOOD PRESSURE: 165 MMHG | HEART RATE: 60 BPM

## 2024-10-26 VITALS — OXYGEN SATURATION: 97 %

## 2024-10-26 VITALS — OXYGEN SATURATION: 87 %

## 2024-10-26 VITALS — OXYGEN SATURATION: 96 %

## 2024-10-26 VITALS — OXYGEN SATURATION: 84 %

## 2024-10-26 VITALS — OXYGEN SATURATION: 90 %

## 2024-10-26 VITALS
SYSTOLIC BLOOD PRESSURE: 151 MMHG | TEMPERATURE: 97.9 F | DIASTOLIC BLOOD PRESSURE: 61 MMHG | OXYGEN SATURATION: 99 % | HEART RATE: 66 BPM

## 2024-10-26 VITALS
DIASTOLIC BLOOD PRESSURE: 50 MMHG | OXYGEN SATURATION: 99 % | HEART RATE: 51 BPM | TEMPERATURE: 98 F | SYSTOLIC BLOOD PRESSURE: 141 MMHG

## 2024-10-26 VITALS
SYSTOLIC BLOOD PRESSURE: 191 MMHG | HEART RATE: 80 BPM | TEMPERATURE: 98.4 F | OXYGEN SATURATION: 97 % | DIASTOLIC BLOOD PRESSURE: 72 MMHG

## 2024-10-26 VITALS
OXYGEN SATURATION: 99 % | DIASTOLIC BLOOD PRESSURE: 67 MMHG | TEMPERATURE: 97.9 F | HEART RATE: 56 BPM | SYSTOLIC BLOOD PRESSURE: 159 MMHG

## 2024-10-26 VITALS — OXYGEN SATURATION: 86 %

## 2024-10-26 VITALS — SYSTOLIC BLOOD PRESSURE: 145 MMHG | DIASTOLIC BLOOD PRESSURE: 55 MMHG | OXYGEN SATURATION: 99 % | HEART RATE: 60 BPM

## 2024-10-26 VITALS — HEART RATE: 49 BPM | DIASTOLIC BLOOD PRESSURE: 52 MMHG | TEMPERATURE: 98.1 F | SYSTOLIC BLOOD PRESSURE: 142 MMHG

## 2024-10-26 VITALS — OXYGEN SATURATION: 85 %

## 2024-10-26 LAB
ANION GAP SERPL CALC-SCNC: 9 MMOL/L (ref 7–16)
BASE EXCESS BLDA CALC-SCNC: 5.8 MMOL/L (ref -2–2)
BASOPHILS # BLD: 0 K/MM3 (ref 0–0.2)
BASOPHILS NFR BLD AUTO: 0.3 % (ref 0–2)
BUN SERPL-MCNC: 35 MG/DL (ref 10–20)
CALCIUM SERPL-MCNC: 8 MG/DL (ref 8.4–10.2)
CHLORIDE SERPL-SCNC: 103 MEQ/L (ref 98–107)
CO2 BLDA-SCNC: 32 MMOL/L
CREAT SERPL-SCNC: 1.85 MG/DL (ref 0.57–1.11)
EOSINOPHIL # BLD: 0.1 K/MM3 (ref 0–0.7)
EOSINOPHIL NFR BLD: 0.8 % (ref 0–4)
ERYTHROCYTE [DISTWIDTH] IN BLOOD BY AUTOMATED COUNT: 15.6 % (ref 11.5–14.5)
GLUCOSE SERPL-MCNC: 101 MG/DL (ref 70–99)
GRANULOCYTES # BLD AUTO: 75.4 % (ref 42.2–75.2)
HCO3 BLDA-SCNC: 30.6 MEQ/L (ref 22–26)
HCT VFR BLD AUTO: 27.6 % (ref 37–47)
HGB BLD-MCNC: 8.9 G/DL (ref 12.5–16)
INHALED O2 CONCENTRATION: 40 %
LYMPHOCYTES # BLD: 0.9 K/MM3 (ref 1.2–3.4)
LYMPHOCYTES NFR BLD: 11.8 % (ref 20–51)
MCH RBC QN AUTO: 29 PG (ref 27–31)
MCHC RBC AUTO-ENTMCNC: 32 G/DL (ref 33–37)
MCV RBC AUTO: 90 FL (ref 80–100)
MONOCYTES # BLD: 0.8 K/MM3 (ref 0.1–0.6)
MONOCYTES NFR BLD AUTO: 11.4 % (ref 1.7–9.3)
NEUTROPHILS # BLD: 5.4 K/MM3 (ref 1.4–6.5)
PCO2 BLDA: 45.4 MMHG (ref 35–45)
PLATELET # BLD AUTO: 129 K/MM3 (ref 130–400)
PMV BLD AUTO: 12 FL (ref 7.4–10.4)
PO2 BLDA: 94 MMHG (ref 80–100)
POTASSIUM SERPL-SCNC: 3.8 MEQ/L (ref 3.5–4.5)
RBC # BLD AUTO: 3.08 M/MM3 (ref 4.1–5.3)
SAO2 % BLDA: 96.9 % (ref 92–100)
SODIUM SERPL-SCNC: 143 MEQ/L (ref 136–145)

## 2024-10-26 NOTE — NUR
Contacted Hospitalist about high blood pressures. First was on left arm, read
190+ systolic. Thought maybe it could be because we had extubated around the
same time, anxiety or discomfort may have played a factor. Then about 40
minutes later, ran another blood pressure and systolic was still elevated
190+. Received order for Hydralazine 10 mg Q6 for BP systolic <170. Gave and
waited 30 minutes, retake was 174 systolic. Patient was complaining of
headache and nausea, working herself up and nauseous. Also had a large stool
at this time. While getting her cleaned, bed was not flat but at a 40% HOB
incline. Her oxygen demands went from 3 liters NC to 15 Liters on oxymask with
minimal recovery. Anti-anxiety med given to help relax patient. Called
respiratory to help with bipap set up to help patient recover.

## 2024-10-26 NOTE — NUR
UPON SHIFT ASSESSMENT, SUGEY WAS AWAKE IN BED WITH BI PAP INPLACE AT 50%
FiO2. O2 SAT WAS % AND PATIENT DENIED SOA OR PAIN. PATIENT OBEYS VERBAL
COMMANDS AND IS A&O X 4. SWALLOW EVAL PERFORMED: HEAD OF BED RAISED TO 90%.
BIPAP REPLACED WITH 3L O2 NC-O2 SAT REMAINED 95%-PATIENT TOLERATED ICE CHIPS,
WATER AND PUDDING WELL.

## 2024-10-26 NOTE — NUR
Data: Spiritual care visit attempted twice today. First attempt, Patient had
visitors and the RN was with them. Second attempt Respiratory Therapy was with
Patient.
 
Assessment: None at this time.
 
Plan of Care: Chaplains will remain available as needed/requested while
Patient is admitted to this hospital.

## 2024-10-26 NOTE — NUR
Patient was extubated today, was able to tolerate 3 liters of oxygen on NC.
Until we turned her to clean her, she desated to mid eighties and with 15
liters of oxygen was at 90% via oxymask. She was placed on bipap, and was
given ativan. Has tolerated the bipap well, she is still somewhat drowsy.
Bipap is maintaining sats of oxygen in the mid ninties.

## 2024-10-26 NOTE — NUR
CALL PLACED TO RT, PATIENT REMOVED BIPAP, RT AT BEDSIDE TO ASSIST WITH
REAPPLICATION. PATIENT STATED, "THIS BLOWS TOO HARD." RT ADDRESSING PATIENT
CONCERNS.
 
PATIENT SLEEPING. NO SIGNS OF DISTRESS.

## 2024-10-26 NOTE — NUR
CPAP TRAIL SINE 0938, ABOUT TO EXTUBATE. WILL BE PLACED ON 3 LITERS NC, AND
HAVE BIPAP ON STANDBY. AT THIS TIME, THERE IS STILL QUITE A FEW SECRETION.
PARTH IS BEDSIDE.

## 2024-10-26 NOTE — NUR
Patient laying in bed, on minimal sedation. She is writing that she is afraid,
and has been asking for her son overnight. Output has been approximately 500
ml overnight light yellow urine. PICC does not look concerning and is infusing
antibiotics, LR, and minimal amount of Propofol. Suctioned this morning yellow
clear drainage from high low tubing, sat her up and turned the TV on. She is
currently resting with call bell bedside. VSS, afebrile, 97.9. No complaints
at this time.

## 2024-10-27 VITALS — OXYGEN SATURATION: 97 %

## 2024-10-27 VITALS — OXYGEN SATURATION: 96 %

## 2024-10-27 VITALS — HEART RATE: 71 BPM | SYSTOLIC BLOOD PRESSURE: 152 MMHG | TEMPERATURE: 99.1 F | DIASTOLIC BLOOD PRESSURE: 64 MMHG

## 2024-10-27 VITALS — OXYGEN SATURATION: 98 %

## 2024-10-27 VITALS — OXYGEN SATURATION: 99 %

## 2024-10-27 VITALS — OXYGEN SATURATION: 100 %

## 2024-10-27 VITALS — OXYGEN SATURATION: 95 %

## 2024-10-27 VITALS — TEMPERATURE: 98.3 F | HEART RATE: 67 BPM | SYSTOLIC BLOOD PRESSURE: 163 MMHG | DIASTOLIC BLOOD PRESSURE: 76 MMHG

## 2024-10-27 VITALS — HEART RATE: 60 BPM | SYSTOLIC BLOOD PRESSURE: 174 MMHG | TEMPERATURE: 98.6 F | DIASTOLIC BLOOD PRESSURE: 82 MMHG

## 2024-10-27 VITALS — OXYGEN SATURATION: 94 %

## 2024-10-27 VITALS
DIASTOLIC BLOOD PRESSURE: 58 MMHG | OXYGEN SATURATION: 97 % | HEART RATE: 71 BPM | SYSTOLIC BLOOD PRESSURE: 137 MMHG | TEMPERATURE: 97.8 F

## 2024-10-27 VITALS
OXYGEN SATURATION: 99 % | SYSTOLIC BLOOD PRESSURE: 152 MMHG | TEMPERATURE: 98 F | DIASTOLIC BLOOD PRESSURE: 55 MMHG | HEART RATE: 59 BPM

## 2024-10-27 VITALS
HEART RATE: 81 BPM | SYSTOLIC BLOOD PRESSURE: 150 MMHG | DIASTOLIC BLOOD PRESSURE: 66 MMHG | OXYGEN SATURATION: 98 % | TEMPERATURE: 98.1 F

## 2024-10-27 VITALS — OXYGEN SATURATION: 87 %

## 2024-10-27 VITALS — SYSTOLIC BLOOD PRESSURE: 147 MMHG | HEART RATE: 68 BPM | TEMPERATURE: 97.2 F | DIASTOLIC BLOOD PRESSURE: 55 MMHG

## 2024-10-27 VITALS — OXYGEN SATURATION: 92 %

## 2024-10-27 VITALS — HEART RATE: 66 BPM | SYSTOLIC BLOOD PRESSURE: 134 MMHG | DIASTOLIC BLOOD PRESSURE: 70 MMHG | TEMPERATURE: 97.6 F

## 2024-10-27 VITALS — OXYGEN SATURATION: 89 %

## 2024-10-27 VITALS — OXYGEN SATURATION: 90 %

## 2024-10-27 VITALS — OXYGEN SATURATION: 88 %

## 2024-10-27 VITALS — OXYGEN SATURATION: 86 %

## 2024-10-27 VITALS — OXYGEN SATURATION: 85 %

## 2024-10-27 VITALS — OXYGEN SATURATION: 93 %

## 2024-10-27 VITALS — SYSTOLIC BLOOD PRESSURE: 134 MMHG

## 2024-10-27 VITALS — OXYGEN SATURATION: 91 %

## 2024-10-27 LAB
ANION GAP SERPL CALC-SCNC: 12 MMOL/L (ref 7–16)
ANISOCYTOSIS BLD QL: (no result)
BASE EXCESS BLDA CALC-SCNC: 4.2 MMOL/L (ref -2–2)
BUN SERPL-MCNC: 37 MG/DL (ref 10–20)
CALCIUM SERPL-MCNC: 8.6 MG/DL (ref 8.4–10.2)
CHLORIDE SERPL-SCNC: 105 MEQ/L (ref 98–107)
CO2 BLDA-SCNC: 32.1 MMOL/L
CREAT SERPL-SCNC: 2.03 MG/DL (ref 0.57–1.11)
ERYTHROCYTE [DISTWIDTH] IN BLOOD BY AUTOMATED COUNT: 15.1 % (ref 11.5–14.5)
GLUCOSE SERPL-MCNC: 171 MG/DL (ref 70–99)
HCO3 BLDA-SCNC: 30.5 MEQ/L (ref 22–26)
HCT VFR BLD AUTO: 30.5 % (ref 37–47)
HGB BLD-MCNC: 9.7 G/DL (ref 12.5–16)
INHALED O2 CONCENTRATION: 40 %
LYMPHOCYTES NFR BLD MANUAL: 3 % (ref 20–51)
MAGNESIUM SERPL-MCNC: 2.1 MG/DL (ref 1.6–2.6)
MCH RBC QN AUTO: 29 PG (ref 27–31)
MCHC RBC AUTO-ENTMCNC: 32 G/DL (ref 33–37)
MCV RBC AUTO: 91 FL (ref 80–100)
MONOCYTES NFR BLD: 5 % (ref 1.7–9.3)
NEUTS SEG NFR BLD MANUAL: 92 % (ref 42–75.2)
PCO2 BLDA: 53.7 MMHG (ref 35–45)
PLATELET # BLD AUTO: 135 K/MM3 (ref 130–400)
PLATELET BLD QL SMEAR: (no result)
PMV BLD AUTO: 11.4 FL (ref 7.4–10.4)
PO2 BLDA: 100.1 MMHG (ref 80–100)
POTASSIUM SERPL-SCNC: 4 MEQ/L (ref 3.5–4.5)
RBC # BLD AUTO: 3.34 M/MM3 (ref 4.1–5.3)
SAO2 % BLDA: 97.8 % (ref 92–100)
SODIUM SERPL-SCNC: 147 MEQ/L (ref 136–145)

## 2024-10-27 NOTE — NUR
SW reviewed electronic chart and viewed that patient would be transfering to
floor today. SW will continue to follow with an attempt to speak with family
regarding placement.

## 2024-10-27 NOTE — NUR
PATIENT RESTING IN BED, BI PAP IN PLACE AND EASY TO AROUSE. PATIENT REFUSED 2
UNITS INSULIN. REQUESTED SIPS OF WATER-TOLERATED WELL. VS ARE WNL. PATIENT
REQUESTING BIPAP BE LEFT OFF FOR A FEW MINUTES, "I NEED IT OFF FOR A MINUTE."
3L 02 NC PLACED, PATIENT SATING 97%.

## 2024-10-27 NOTE — NUR
Patient is laying in bed, on 3 liters of oxygen with 98% reading. Blood
pressure is elevated in the 160's and she is complaining of a dull headache.
She passed her bedside swallow study and was able to drink water and eat ice
chips without difficulty. Will continue to monitor and notify physician of
changes.

## 2024-10-27 NOTE — NUR
PATIENT REMAINS OFF BIPAP AND ON 3L NC-TOLERAING WELL. RT TO BEDSIDE TO
ASSESS AND AGREEABLE TO CONTINUE TO ALLOW NC O2 3L. VS ARE WNL. O2 SAT 98%, RR
13.

## 2024-10-28 VITALS — SYSTOLIC BLOOD PRESSURE: 151 MMHG | TEMPERATURE: 98.2 F | DIASTOLIC BLOOD PRESSURE: 73 MMHG | HEART RATE: 70 BPM

## 2024-10-28 VITALS — SYSTOLIC BLOOD PRESSURE: 145 MMHG | HEART RATE: 55 BPM | TEMPERATURE: 98.4 F | DIASTOLIC BLOOD PRESSURE: 48 MMHG

## 2024-10-28 VITALS — HEART RATE: 63 BPM | DIASTOLIC BLOOD PRESSURE: 73 MMHG | TEMPERATURE: 97.5 F | SYSTOLIC BLOOD PRESSURE: 157 MMHG

## 2024-10-28 VITALS — DIASTOLIC BLOOD PRESSURE: 71 MMHG | TEMPERATURE: 98.3 F | HEART RATE: 63 BPM | SYSTOLIC BLOOD PRESSURE: 175 MMHG

## 2024-10-28 VITALS — TEMPERATURE: 98.4 F | HEART RATE: 66 BPM | SYSTOLIC BLOOD PRESSURE: 153 MMHG | DIASTOLIC BLOOD PRESSURE: 72 MMHG

## 2024-10-28 VITALS — SYSTOLIC BLOOD PRESSURE: 153 MMHG

## 2024-10-28 VITALS — SYSTOLIC BLOOD PRESSURE: 119 MMHG | HEART RATE: 67 BPM | DIASTOLIC BLOOD PRESSURE: 62 MMHG | TEMPERATURE: 98.2 F

## 2024-10-28 VITALS — SYSTOLIC BLOOD PRESSURE: 151 MMHG

## 2024-10-28 VITALS — SYSTOLIC BLOOD PRESSURE: 145 MMHG

## 2024-10-28 VITALS — SYSTOLIC BLOOD PRESSURE: 157 MMHG

## 2024-10-28 VITALS — SYSTOLIC BLOOD PRESSURE: 119 MMHG

## 2024-10-28 VITALS — SYSTOLIC BLOOD PRESSURE: 174 MMHG

## 2024-10-28 LAB
ANION GAP SERPL CALC-SCNC: 14 MMOL/L (ref 7–16)
BUN SERPL-MCNC: 46 MG/DL (ref 10–20)
CALCIUM SERPL-MCNC: 8.1 MG/DL (ref 8.4–10.2)
CHLORIDE SERPL-SCNC: 102 MEQ/L (ref 98–107)
CREAT SERPL-SCNC: 2.52 MG/DL (ref 0.57–1.11)
GLUCOSE SERPL-MCNC: 197 MG/DL (ref 70–99)
POTASSIUM SERPL-SCNC: 3.7 MEQ/L (ref 3.5–4.5)
SODIUM SERPL-SCNC: 144 MEQ/L (ref 136–145)

## 2024-10-28 NOTE — NUR
SW attended clinical rounds.  Patient recommended for SNF rehab at discharge.
[email or web address] list provided by previous GIORGI Mar on Friday.  SW met with
patient briefly after rounds to discuss her choices.  Patient stated she has
not talked with her family but will discuss it with them when they visit this
evening after work.  SW discussed need for referrals to be sent as early as
possible today due to her improvement in condition and nearing DC ready, also
that patient will require authorization from [employer].  GIORGI called
patient's son Munir to discuss.  He stated that their choice is [facility].  GIORGI
called Nanette at [facility] to make referral.
 
Discharge plan:  SB/SNF

## 2024-10-28 NOTE — NUR
UPON SHIFT ASSESSMENT, PATIENT WAS AWAKE WITH FAMILY BEDSIDE. SHE IS A&O X4
AND IN GOOD SPIRITS. LUNG SOUNDS ARE DIMINISHED, BUT CLEAR. BLLE EXHIBITS
STASIS RELATED ERYTHEMIA. PATIENT HAS BEEN PLACED ON O2 TRIAL AND IS
TOLERATING 2L NC WELL. BG REMAINS ELEVATED D/T DECADRONE-SLIDING SCALE INSULIN
GIVEN. VS ARE WNL, CALL LIGHT WITHIN REACH AND BED ALARM ON. PATIENT REFUSES
SCDs AND BIPAP.

## 2024-10-28 NOTE — NUR
Patient is sitting up on the side of the bed, alert and oriented x 4, VSS.
Getting 2L O2 NC. States no pain or discomfort. Telemery and cath mckeon in
place. Clear yellow output. No further needs at this time. Call light within
reach.

## 2024-10-29 VITALS — SYSTOLIC BLOOD PRESSURE: 171 MMHG | TEMPERATURE: 98.3 F | HEART RATE: 62 BPM | DIASTOLIC BLOOD PRESSURE: 72 MMHG

## 2024-10-29 VITALS — SYSTOLIC BLOOD PRESSURE: 173 MMHG

## 2024-10-29 VITALS — DIASTOLIC BLOOD PRESSURE: 71 MMHG | HEART RATE: 64 BPM | TEMPERATURE: 98 F | SYSTOLIC BLOOD PRESSURE: 155 MMHG

## 2024-10-29 VITALS — SYSTOLIC BLOOD PRESSURE: 160 MMHG

## 2024-10-29 VITALS — HEART RATE: 60 BPM | DIASTOLIC BLOOD PRESSURE: 74 MMHG | SYSTOLIC BLOOD PRESSURE: 176 MMHG | TEMPERATURE: 98.8 F

## 2024-10-29 VITALS — HEART RATE: 59 BPM | TEMPERATURE: 98.1 F | DIASTOLIC BLOOD PRESSURE: 62 MMHG | SYSTOLIC BLOOD PRESSURE: 160 MMHG

## 2024-10-29 VITALS — SYSTOLIC BLOOD PRESSURE: 155 MMHG

## 2024-10-29 VITALS — SYSTOLIC BLOOD PRESSURE: 186 MMHG

## 2024-10-29 VITALS — DIASTOLIC BLOOD PRESSURE: 72 MMHG | HEART RATE: 61 BPM | SYSTOLIC BLOOD PRESSURE: 173 MMHG | TEMPERATURE: 98.5 F

## 2024-10-29 VITALS — SYSTOLIC BLOOD PRESSURE: 144 MMHG

## 2024-10-29 VITALS — DIASTOLIC BLOOD PRESSURE: 63 MMHG | TEMPERATURE: 98.2 F | SYSTOLIC BLOOD PRESSURE: 144 MMHG | HEART RATE: 60 BPM

## 2024-10-29 VITALS — SYSTOLIC BLOOD PRESSURE: 164 MMHG | DIASTOLIC BLOOD PRESSURE: 74 MMHG

## 2024-10-29 LAB
ANION GAP SERPL CALC-SCNC: 12 MMOL/L (ref 7–16)
BASE EXCESS BLDA CALC-SCNC: 3.8 MMOL/L (ref -2–2)
BASOPHILS # BLD: 0 K/MM3 (ref 0–0.2)
BASOPHILS NFR BLD AUTO: 0 % (ref 0–2)
BUN SERPL-MCNC: 58 MG/DL (ref 10–20)
CALCIUM SERPL-MCNC: 8.8 MG/DL (ref 8.4–10.2)
CHLORIDE SERPL-SCNC: 104 MEQ/L (ref 98–107)
CO2 BLDA-SCNC: 31.1 MMOL/L
CREAT SERPL-SCNC: 2.48 MG/DL (ref 0.57–1.11)
EOSINOPHIL # BLD: 0 K/MM3 (ref 0–0.7)
EOSINOPHIL NFR BLD: 0 % (ref 0–4)
ERYTHROCYTE [DISTWIDTH] IN BLOOD BY AUTOMATED COUNT: 15.8 % (ref 11.5–14.5)
GLUCOSE SERPL-MCNC: 180 MG/DL (ref 70–99)
GRANULOCYTES # BLD AUTO: 83.6 % (ref 42.2–75.2)
HCO3 BLDA-SCNC: 29.5 MEQ/L (ref 22–26)
HCT VFR BLD AUTO: 27.9 % (ref 37–47)
HGB BLD-MCNC: 8.8 G/DL (ref 12.5–16)
LYMPHOCYTES # BLD: 0.5 K/MM3 (ref 1.2–3.4)
LYMPHOCYTES NFR BLD: 5.5 % (ref 20–51)
MCH RBC QN AUTO: 29 PG (ref 27–31)
MCHC RBC AUTO-ENTMCNC: 32 G/DL (ref 33–37)
MCV RBC AUTO: 93 FL (ref 80–100)
MONOCYTES # BLD: 1 K/MM3 (ref 0.1–0.6)
MONOCYTES NFR BLD AUTO: 10.5 % (ref 1.7–9.3)
NEUTROPHILS # BLD: 7.8 K/MM3 (ref 1.4–6.5)
PCO2 BLDA: 50.7 MMHG (ref 35–45)
PLATELET # BLD AUTO: 144 K/MM3 (ref 130–400)
PMV BLD AUTO: 11.6 FL (ref 7.4–10.4)
PO2 BLDA: 98.7 MMHG (ref 80–100)
POTASSIUM SERPL-SCNC: 3.9 MEQ/L (ref 3.5–4.5)
RBC # BLD AUTO: 3.01 M/MM3 (ref 4.1–5.3)
SAO2 % BLDA: 97.1 % (ref 92–100)
SODIUM SERPL-SCNC: 145 MEQ/L (ref 136–145)

## 2024-10-29 NOTE — NUR
GIORGI attended clinical rounds.  GIORGI spoke with Nanette at [facility] .  Nanette stated
that auth is pending and she will let SW know insurance determination.
Patient updated on referral status.
 
Discharge plan:  SB

## 2024-10-29 NOTE — NUR
GIORGI received call from Nanette at [facility] stating they have received
authorization from insurance to admit patient.  They are pending
recommendations from Nephrology.  GIORGI informed Nanette that Dr. Mcelroy has not
seen patient today.  GIORGI will follow up tomorrow morning.

## 2024-10-29 NOTE — NUR
Patient sitting up in bed, A&Ox4. VSS 2L NC O2. No reported SOB. Denies pain
and discomfort. Call light within reach. Bed alarm on

## 2024-10-30 VITALS — DIASTOLIC BLOOD PRESSURE: 81 MMHG | SYSTOLIC BLOOD PRESSURE: 165 MMHG | HEART RATE: 61 BPM | TEMPERATURE: 98 F

## 2024-10-30 VITALS — HEART RATE: 64 BPM | TEMPERATURE: 98.1 F | DIASTOLIC BLOOD PRESSURE: 71 MMHG | SYSTOLIC BLOOD PRESSURE: 170 MMHG

## 2024-10-30 VITALS — SYSTOLIC BLOOD PRESSURE: 158 MMHG | HEART RATE: 63 BPM | TEMPERATURE: 97.6 F | DIASTOLIC BLOOD PRESSURE: 72 MMHG

## 2024-10-30 VITALS — SYSTOLIC BLOOD PRESSURE: 170 MMHG

## 2024-10-30 VITALS — SYSTOLIC BLOOD PRESSURE: 171 MMHG

## 2024-10-30 VITALS — SYSTOLIC BLOOD PRESSURE: 158 MMHG

## 2024-10-30 VITALS — SYSTOLIC BLOOD PRESSURE: 165 MMHG

## 2024-10-30 LAB
ANION GAP SERPL CALC-SCNC: 12 MMOL/L (ref 7–16)
BASOPHILS # BLD: 0 K/MM3 (ref 0–0.2)
BASOPHILS NFR BLD AUTO: 0 % (ref 0–2)
BUN SERPL-MCNC: 60 MG/DL (ref 10–20)
CALCIUM SERPL-MCNC: 8.3 MG/DL (ref 8.4–10.2)
CHLORIDE SERPL-SCNC: 104 MEQ/L (ref 98–107)
CREAT SERPL-SCNC: 2.51 MG/DL (ref 0.57–1.11)
EOSINOPHIL # BLD: 0 K/MM3 (ref 0–0.7)
EOSINOPHIL NFR BLD: 0.1 % (ref 0–4)
ERYTHROCYTE [DISTWIDTH] IN BLOOD BY AUTOMATED COUNT: 15.6 % (ref 11.5–14.5)
GLUCOSE SERPL-MCNC: 230 MG/DL (ref 70–99)
GRANULOCYTES # BLD AUTO: 80.7 % (ref 42.2–75.2)
HCT VFR BLD AUTO: 30.2 % (ref 37–47)
HGB BLD-MCNC: 9.6 G/DL (ref 12.5–16)
LYMPHOCYTES # BLD: 0.6 K/MM3 (ref 1.2–3.4)
LYMPHOCYTES NFR BLD: 7.1 % (ref 20–51)
MCH RBC QN AUTO: 29 PG (ref 27–31)
MCHC RBC AUTO-ENTMCNC: 32 G/DL (ref 33–37)
MCV RBC AUTO: 92 FL (ref 80–100)
MONOCYTES # BLD: 1 K/MM3 (ref 0.1–0.6)
MONOCYTES NFR BLD AUTO: 11.4 % (ref 1.7–9.3)
NEUTROPHILS # BLD: 7.1 K/MM3 (ref 1.4–6.5)
PLATELET # BLD AUTO: 156 K/MM3 (ref 130–400)
PMV BLD AUTO: 11.4 FL (ref 7.4–10.4)
POTASSIUM SERPL-SCNC: 3.9 MEQ/L (ref 3.5–4.5)
RBC # BLD AUTO: 3.3 M/MM3 (ref 4.1–5.3)
SODIUM SERPL-SCNC: 143 MEQ/L (ref 136–145)

## 2024-10-30 NOTE — NUR
SW attende clinical rounds earlier this morning.  Patient stable for
discharge.  GIORGI spoke with Shawna at [facility] to confirm admission today.
Shawna returned call and provided doc to doc #[phone] and Nurse to Nurse
#[phone].  Information proivded to attending Dr. Venegas and RN.  GIORGI called
patient's son Munir to notify of dicharge.  He states his cousin will 
patient at 1230 to transport to .  [facility] and RN notified of transport time.  GIORGI
met with patient to review Medicare IM form.  Pateint voiced understanding and
agreement with discharge, signed the form.  Original on chart, copy to
patient.
 
Discharge plan:  [facility]

## 2024-10-30 NOTE — NUR
PATIENT ESCORTED OFF UNIT AT APPROX 1320. THIS RN CALLED MARIAJOSE FUNES. ALL
BELONGINGS WITH SON AND PATIENT.